# Patient Record
Sex: FEMALE | Race: WHITE | NOT HISPANIC OR LATINO | Employment: STUDENT | URBAN - METROPOLITAN AREA
[De-identification: names, ages, dates, MRNs, and addresses within clinical notes are randomized per-mention and may not be internally consistent; named-entity substitution may affect disease eponyms.]

---
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
## 2022-01-04 ENCOUNTER — OFFICE VISIT (OUTPATIENT)
Dept: OBGYN CLINIC | Facility: CLINIC | Age: 22
End: 2022-01-04
Payer: COMMERCIAL

## 2022-01-04 ENCOUNTER — APPOINTMENT (OUTPATIENT)
Dept: RADIOLOGY | Facility: CLINIC | Age: 22
End: 2022-01-04
Payer: COMMERCIAL

## 2022-01-04 VITALS
WEIGHT: 121 LBS | DIASTOLIC BLOOD PRESSURE: 75 MMHG | SYSTOLIC BLOOD PRESSURE: 113 MMHG | TEMPERATURE: 97.5 F | HEART RATE: 85 BPM | HEIGHT: 67 IN | BODY MASS INDEX: 18.99 KG/M2

## 2022-01-04 DIAGNOSIS — M25.561 ACUTE PAIN OF RIGHT KNEE: ICD-10-CM

## 2022-01-04 DIAGNOSIS — Q79.60 EHLERS-DANLOS SYNDROME: ICD-10-CM

## 2022-01-04 DIAGNOSIS — Z98.890 HISTORY OF RIGHT KNEE SURGERY: ICD-10-CM

## 2022-01-04 DIAGNOSIS — M25.561 ACUTE PAIN OF RIGHT KNEE: Primary | ICD-10-CM

## 2022-01-04 PROCEDURE — 99214 OFFICE O/P EST MOD 30 MIN: CPT | Performed by: ORTHOPAEDIC SURGERY

## 2022-01-04 PROCEDURE — 73562 X-RAY EXAM OF KNEE 3: CPT

## 2022-01-04 NOTE — PROGRESS NOTES
Assessment/Plan:  1  Acute pain of right knee  XR knee 3 vw right non injury    MRI knee right wo contrast   2  Logan-Danlos syndrome     3  History of right knee surgery         Too Cruz has right-sided knee pain and a injury concerning for a possible tear  I am concerned that she may have had a repeat patellar subluxation and possible MPFL injury as well as possibility of a lateral meniscus tear  There is also an abnormality over the medial patella which is unclear of prior postoperative changes or new chondral defect  Given her effusion, history and examination today I do think an MRI of the right knee is warranted  Advised to avoid athletic activity and can weightbear as tolerated for now  She can continue with ice and elevation for now  Follow-up after MRI is complete  Subjective:   Quoc Turner is a 25 y o  female who presents * to the office for evaluation for right-sided knee pain  She has a history of Logan-Danlos syndrome and history of recent MPFL reconstruction by Dr Sohan Oliver in 2020  She had a new injury 5 days ago while she was playing soccer with friends  She had a hyperextension injury to her right knee and had severe pain  She had difficulty walking and swelling throughout the knee  She states her knee was quite swollen last night  She has very difficult time bending her knee or fully extending her knee  She did not witness a patellar dislocation with this injury but does feel like "something weird" happened within knee  Today she has aching throbbing pain throughout the anterior and lateral aspect of her right knee  It is difficult for her to walk and her pain does improve with rest and ice  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 [employer] for the next month      Review of Systems   Constitutional: Negative for chills, fever and unexpected weight change  HENT: Negative for hearing loss, nosebleeds and sore throat  Eyes: Negative for pain, redness and visual disturbance  Respiratory: Negative for cough, shortness of breath and wheezing  Cardiovascular: Negative for chest pain, palpitations and leg swelling  Gastrointestinal: Negative for abdominal pain, nausea and vomiting  Endocrine: Negative for polydipsia and polyuria  Genitourinary: Negative for dysuria and hematuria  Musculoskeletal:        See HPI   Skin: Negative for rash and wound  Neurological: Negative for dizziness, numbness and headaches  Psychiatric/Behavioral: Negative for decreased concentration and suicidal ideas  The patient is not nervous/anxious  Past Medical History:   Diagnosis Date    ADHD     Asthma     Pernio     Raynaud disease        Past Surgical History:   Procedure Laterality Date    HAND SURGERY      ganglion cyst    AK KNEE SCOPE,DIAGNOSTIC Right 5/21/2020    Procedure: ARTHROSCOPY KNEE;  Surgeon: Lb Newberry MD;  Location: [address] [place] [address];  Service: Orthopedics    AK LIGMT REVISION,KNEE,EXTRA-ARTIC Right 5/21/2020    Procedure: KNEE DIAGNOSTIC ARTHROSCOPY WITH OPEN MEDIAL PATELLOFEMORAL LIGAMENT RECONSTRUCTION WITH HAMSTRING AUTOGRAFT (MPFL);   Surgeon: Lb Newberry MD;  Location: [facility];  Service: Orthopedics       Family History   Problem Relation Age of Onset    No Known Problems Mother     No Known Problems Father     No Known Problems Sister     No Known Problems Brother     No Known Problems Maternal Aunt     No Known Problems Maternal Uncle     No Known Problems Paternal Aunt     No Known Problems Paternal Uncle     No Known Problems Maternal Grandmother     No Known Problems Maternal Grandfather     No Known Problems Paternal Grandmother     No Known Problems Paternal Grandfather        Social History     Occupational History    Not on file   Tobacco Use    Smoking status: Never Smoker    Smokeless tobacco: Never Used   Vaping Use    Vaping Use: Never used   Substance and Sexual Activity    Alcohol use: No    Drug use: No    Sexual activity: Not on file         Current Outpatient Medications:     albuterol (PROVENTIL HFA,VENTOLIN HFA) 90 mcg/act inhaler, Inhale 2 puffs every 6 (six) hours as needed for wheezing, Disp: , Rfl:     budesonide-formoterol (SYMBICORT) 160-4 5 mcg/act inhaler, Inhale 2 puffs 2 (two) times a day Rinse mouth after use , Disp: , Rfl:     EPINEPHrine (EPIPEN 2-NORA) 0 3 mg/0 3 mL SOAJ, , Disp: , Rfl:     fluticasone (FLONASE) 50 mcg/act nasal spray, 1 spray into each nostril daily, Disp: , Rfl:     methylphenidate (CONCERTA) 18 mg ER tablet, Take 18 mg by mouth daily as needed , Disp: , Rfl:     olopatadine (PATANOL) 0 1 % ophthalmic solution, , Disp: , Rfl:     calcium carbonate-vitamin D (OSCAL-D) 500 mg-200 units per tablet, Take 1 tablet by mouth 2 (two) times a day with meals (Patient not taking: Reported on 1/4/2022 ), Disp: , Rfl:     midodrine (PROAMATINE) 2 5 mg tablet, TAKE 1/2 TABLET BY MOUTH EVERYDAY FOR 2 MONTHS, THEN 1 TABLET EVERYDAY  DO NOT LAY FLAT FOR 4 HOURS (Patient not taking: Reported on 1/4/2022), Disp: , Rfl:     Allergies   Allergen Reactions    Betadine [Povidone Iodine]      HIVES    Celebrex [Celecoxib] Hives    Nuts - Food Allergy     Other Hives     PEANUTS, TREENUTS, CELERY    Soya Lecithin [Lecithin]        Objective:  Vitals:    01/04/22 1115   BP: 113/75   Pulse: 85   Temp: 97 5 °F (36 4 °C)       Right Knee Exam     Tenderness   The patient is experiencing tenderness in the lateral joint line, patella and medial retinaculum  Range of Motion   Extension:  -5 abnormal   Flexion:  120 abnormal     Tests   Justyn:  Medial - negative Lateral - positive  Varus: negative Valgus: negative  Lachman:  Anterior - trace      Drawer:  Anterior - negative    Posterior - negative    Other   Erythema: absent  Sensation: normal  Pulse: present  Swelling: mild  Effusion: effusion present          Observations     Right Knee   Positive for effusion         Physical Exam  Vitals and nursing note reviewed  Constitutional:       Appearance: She is well-developed  HENT:      Head: Normocephalic and atraumatic  Eyes:      General: No scleral icterus  Conjunctiva/sclera: Conjunctivae normal    Cardiovascular:      Rate and Rhythm: Normal rate  Pulmonary:      Effort: Pulmonary effort is normal  No respiratory distress  Musculoskeletal:      Cervical back: Normal range of motion and neck supple  Right knee: Effusion present  Instability Tests: Lateral Justyn test positive  Medial Justyn test negative  Comments: As noted in HPI   Skin:     General: Skin is warm and dry  Neurological:      Mental Status: She is alert and oriented to person, place, and time  Psychiatric:         Behavior: Behavior normal          I have personally reviewed pertinent films in PACS and my interpretation is as follows: Three-view x-ray of the right knee demonstrates postoperative changes without evidence of clear fracture  Mild joint effusion    Abnormality over the medial patella concerning for chondral defect versus postoperative MPFL reconstruction

## 2022-01-21 ENCOUNTER — HOSPITAL ENCOUNTER (OUTPATIENT)
Dept: RADIOLOGY | Facility: HOSPITAL | Age: 22
Discharge: HOME | End: 2022-01-21
Attending: ORTHOPAEDIC SURGERY
Payer: COMMERCIAL

## 2022-01-21 DIAGNOSIS — M25.561 ACUTE PAIN OF RIGHT KNEE: ICD-10-CM

## 2022-01-21 PROCEDURE — G1004 CDSM NDSC: HCPCS

## 2022-01-21 PROCEDURE — 73721 MRI JNT OF LWR EXTRE W/O DYE: CPT

## 2022-01-26 ENCOUNTER — OFFICE VISIT (OUTPATIENT)
Dept: OBGYN CLINIC | Facility: CLINIC | Age: 22
End: 2022-01-26
Payer: COMMERCIAL

## 2022-01-26 VITALS
BODY MASS INDEX: 18.99 KG/M2 | TEMPERATURE: 97 F | WEIGHT: 121 LBS | DIASTOLIC BLOOD PRESSURE: 70 MMHG | SYSTOLIC BLOOD PRESSURE: 107 MMHG | HEIGHT: 67 IN | HEART RATE: 69 BPM

## 2022-01-26 DIAGNOSIS — S83.001A PATELLAR SUBLUXATION, RIGHT, INITIAL ENCOUNTER: Primary | ICD-10-CM

## 2022-01-26 PROCEDURE — 99213 OFFICE O/P EST LOW 20 MIN: CPT | Performed by: ORTHOPAEDIC SURGERY

## 2022-01-26 NOTE — PROGRESS NOTES
Assessment/Plan:  1  Patellar subluxation, right, initial encounter         Too Cruz has right-sided knee pain consistent with a patellar subluxation  Her MRI does not show any evidence of tear and her postoperative changes in the knee appears stable  She does not appear have a new injury or bruising along the knee suggestive of a patellar dislocation  I think she simply suffered a subluxation and will significantly improve over the next few weeks  I did give her a patellar stabilizing knee brace today which she can wear with future for sports and can wear this temporarily with physical activity  I gave her home exercises and we could consider formal physical therapy if she continues to have pain  Follow up as needed  Subjective:   Quoc Turner is a 25 y o  female who presents to the office for follow-up for right-sided knee pain  She was last in the office 3 weeks ago after right knee injury  She was playing soccer and hyperextend her right knee and felt severe pain over the anterior aspect the knee  She felt like her kneecap buckled on her  She does have a history of Logan-Danlos syndrome  She also has a history of MPFL reconstruction by Dr Sohan Oliver 2 years ago  At last office visit she was sent for an MRI and she returns for results today  She states that her knee does feel better and she still has some mild discomfort laterally  Her swelling has gone down throughout the knee and she denies mechanical symptoms of locking or catching  Review of Systems   Constitutional: Negative for chills, fever and unexpected weight change  HENT: Negative for hearing loss, nosebleeds and sore throat  Eyes: Negative for pain, redness and visual disturbance  Respiratory: Negative for cough, shortness of breath and wheezing  Cardiovascular: Negative for chest pain, palpitations and leg swelling  Gastrointestinal: Negative for abdominal pain, nausea and vomiting     Endocrine: Negative for polydipsia and polyuria  Genitourinary: Negative for dysuria and hematuria  Musculoskeletal:        See HPI   Skin: Negative for rash and wound  Neurological: Negative for dizziness, numbness and headaches  Psychiatric/Behavioral: Negative for decreased concentration and suicidal ideas  The patient is not nervous/anxious  Past Medical History:   Diagnosis Date    ADHD     Asthma     Pernio     Raynaud disease        Past Surgical History:   Procedure Laterality Date    HAND SURGERY      ganglion cyst    ME KNEE SCOPE,DIAGNOSTIC Right 5/21/2020    Procedure: ARTHROSCOPY KNEE;  Surgeon: Cezar Parada MD;  Location: [address] [place] [address];  Service: Orthopedics    ME LIGMT REVISION,KNEE,EXTRA-ARTIC Right 5/21/2020    Procedure: KNEE DIAGNOSTIC ARTHROSCOPY WITH OPEN MEDIAL PATELLOFEMORAL LIGAMENT RECONSTRUCTION WITH HAMSTRING AUTOGRAFT (MPFL);   Surgeon: Cezar Parada MD;  Location: [facility];  Service: Orthopedics       Family History   Problem Relation Age of Onset    No Known Problems Mother     No Known Problems Father     No Known Problems Sister     No Known Problems Brother     No Known Problems Maternal Aunt     No Known Problems Maternal Uncle     No Known Problems Paternal Aunt     No Known Problems Paternal Uncle     No Known Problems Maternal Grandmother     No Known Problems Maternal Grandfather     No Known Problems Paternal Grandmother     No Known Problems Paternal Grandfather        Social History     Occupational History    Not on file   Tobacco Use    Smoking status: Never Smoker    Smokeless tobacco: Never Used   Vaping Use    Vaping Use: Never used   Substance and Sexual Activity    Alcohol use: No    Drug use: No    Sexual activity: Not on file         Current Outpatient Medications:     albuterol (PROVENTIL HFA,VENTOLIN HFA) 90 mcg/act inhaler, Inhale 2 puffs every 6 (six) hours as needed for wheezing, Disp: , Rfl:     budesonide-formoterol (SYMBICORT) 160-4 5 mcg/act inhaler, Inhale 2 puffs 2 (two) times a day Rinse mouth after use , Disp: , Rfl:     EPINEPHrine (EPIPEN 2-NORA) 0 3 mg/0 3 mL SOAJ, , Disp: , Rfl:     fluticasone (FLONASE) 50 mcg/act nasal spray, 1 spray into each nostril daily, Disp: , Rfl:     methylphenidate (CONCERTA) 18 mg ER tablet, Take 18 mg by mouth daily as needed , Disp: , Rfl:     olopatadine (PATANOL) 0 1 % ophthalmic solution, , Disp: , Rfl:     calcium carbonate-vitamin D (OSCAL-D) 500 mg-200 units per tablet, Take 1 tablet by mouth 2 (two) times a day with meals (Patient not taking: Reported on 1/4/2022 ), Disp: , Rfl:     midodrine (PROAMATINE) 2 5 mg tablet, TAKE 1/2 TABLET BY MOUTH EVERYDAY FOR 2 MONTHS, THEN 1 TABLET EVERYDAY  DO NOT LAY FLAT FOR 4 HOURS (Patient not taking: Reported on 1/4/2022), Disp: , Rfl:     Allergies   Allergen Reactions    Betadine [Povidone Iodine]      HIVES    Celebrex [Celecoxib] Hives    Nuts - Food Allergy     Other Hives     PEANUTS, TREENUTS, CELERY    Soya Lecithin [Lecithin]        Objective:  Vitals:    01/26/22 0933   BP: 107/70   Pulse: 69   Temp: (!) 97 °F (36 1 °C)       Right Knee Exam     Tenderness   The patient is experiencing tenderness in the patella and lateral retinaculum  Range of Motion   Extension: normal   Flexion: normal     Tests   Justyn:  Medial - negative Lateral - negative    Other   Erythema: absent  Sensation: normal  Pulse: present  Swelling: none  Effusion: no effusion present          Observations     Right Knee   Negative for effusion  Physical Exam  Vitals and nursing note reviewed  Constitutional:       Appearance: She is well-developed  HENT:      Head: Normocephalic and atraumatic  Eyes:      General: No scleral icterus  Conjunctiva/sclera: Conjunctivae normal    Cardiovascular:      Rate and Rhythm: Normal rate  Pulmonary:      Effort: Pulmonary effort is normal  No respiratory distress     Musculoskeletal:      Cervical back: Normal range of motion and neck supple  Right knee: No effusion  Instability Tests: Medial Justyn test negative and lateral Justyn test negative  Comments: As noted in HPI   Skin:     General: Skin is warm and dry  Neurological:      Mental Status: She is alert and oriented to person, place, and time  Psychiatric:         Behavior: Behavior normal          I have personally reviewed pertinent films in PACS and my interpretation is as follows:  MRI of the right knee demonstrates no evidence of acute tear  Postoperative changes status post MPFL reconstruction appears stable    No evidence of recent patellar dislocation

## 2022-07-05 ENCOUNTER — TELEPHONE (OUTPATIENT)
Dept: CARDIOLOGY CLINIC | Facility: CLINIC | Age: 22
End: 2022-07-05

## 2022-07-05 ENCOUNTER — OFFICE VISIT (OUTPATIENT)
Dept: CARDIOLOGY CLINIC | Facility: CLINIC | Age: 22
End: 2022-07-05
Payer: COMMERCIAL

## 2022-07-05 VITALS
HEIGHT: 67 IN | WEIGHT: 118 LBS | HEART RATE: 59 BPM | OXYGEN SATURATION: 99 % | TEMPERATURE: 97.7 F | DIASTOLIC BLOOD PRESSURE: 64 MMHG | SYSTOLIC BLOOD PRESSURE: 90 MMHG | BODY MASS INDEX: 18.52 KG/M2

## 2022-07-05 DIAGNOSIS — R51.9 CHRONIC NONINTRACTABLE HEADACHE, UNSPECIFIED HEADACHE TYPE: ICD-10-CM

## 2022-07-05 DIAGNOSIS — G90.A POTS (POSTURAL ORTHOSTATIC TACHYCARDIA SYNDROME): ICD-10-CM

## 2022-07-05 DIAGNOSIS — I73.00 RAYNAUD'S PHENOMENON WITHOUT GANGRENE: ICD-10-CM

## 2022-07-05 DIAGNOSIS — Q79.60 EHLERS-DANLOS SYNDROME: ICD-10-CM

## 2022-07-05 DIAGNOSIS — R55 POSTURAL DIZZINESS WITH NEAR SYNCOPE: ICD-10-CM

## 2022-07-05 DIAGNOSIS — M35.9 AUTOIMMUNE CONNECTIVE TISSUE DISORDER (HCC): ICD-10-CM

## 2022-07-05 DIAGNOSIS — R42 POSTURAL DIZZINESS WITH NEAR SYNCOPE: ICD-10-CM

## 2022-07-05 DIAGNOSIS — R00.2 INTERMITTENT PALPITATIONS: ICD-10-CM

## 2022-07-05 DIAGNOSIS — G89.29 CHRONIC NONINTRACTABLE HEADACHE, UNSPECIFIED HEADACHE TYPE: ICD-10-CM

## 2022-07-05 DIAGNOSIS — Z86.79 HISTORY OF PSVT (PAROXYSMAL SUPRAVENTRICULAR TACHYCARDIA): Primary | ICD-10-CM

## 2022-07-05 PROBLEM — I49.8 POTS (POSTURAL ORTHOSTATIC TACHYCARDIA SYNDROME): Status: ACTIVE | Noted: 2022-07-05

## 2022-07-05 PROCEDURE — 93000 ELECTROCARDIOGRAM COMPLETE: CPT | Performed by: INTERNAL MEDICINE

## 2022-07-05 PROCEDURE — 99215 OFFICE O/P EST HI 40 MIN: CPT | Performed by: INTERNAL MEDICINE

## 2022-07-05 NOTE — PATIENT INSTRUCTIONS
We have asked the patient to sign a record release authorization and will fax that to the office of Dr Noel Villarreal, office number [phone]  We will specifically request results of prior tilt-table test, alter monitor, echocardiogram, and last pertinent office note  We have ordered a head upright tilt-table test update, a three day Zio XT patch monitor and will consider a future exercise treadmill test to document cardiac rhythm and blood pressure responses to exercise  We will contact the patient with the results of the above studies as they become available  Continue current medication for now

## 2022-07-05 NOTE — PROGRESS NOTES
HISTORY & PHYSICAL  Jayme Altamirano 23 y o  female MRN: [id number]  Unit/Bed#:  Encounter: [id number]  Assessment:    1  Persistent marked tachycardia with exercise, along with postural near syncope, both present for approximately six months, possibly related to known underlying POTS  2  Clinical diagnosis of Logan-Danlos syndrome, for at least two years  3  Unspecified autoimmune disease, based on abnormal RENITA titer of 1:320 with speckled pattern  4  History of Raynaud's phenomenon  5  Chronic generalized headache, primarily with upright position for prolonged periods, present for 6-7 years  6  History of chronic fatigue  7  History of ADHD  Plan:      Patient Instructions     1  We have asked the patient to sign a record release authorization and will fax that to the office of Dr Hortensia Canchola, office number [phone]  We will specifically request results of prior tilt-table test, Holter monitor, echocardiogram, and last pertinent office note  2  Since patient has progressive posturaldizziness and tachycardia over the past six months with suspicion of POTS,  we have ordered a head upright tilt-table test update, a three day Zio XT patch monitor and will consider a future exercise treadmill test to document cardiac rhythm and blood pressure responses to exercise  3   We will contact the patient with the results of the above studies as they become available  4  Continue current medication for now  Current Outpatient Medications   Medication Sig Dispense Refill    albuterol (PROVENTIL HFA,VENTOLIN HFA) 90 mcg/act inhaler Inhale 2 puffs every 6 (six) hours as needed for wheezing      budesonide-formoterol (SYMBICORT) 160-4 5 mcg/act inhaler Inhale 2 puffs 2 (two) times a day Rinse mouth after use        EPINEPHrine (EPIPEN) 0 3 mg/0 3 mL SOAJ       methylphenidate (CONCERTA) 18 mg ER tablet Take 18 mg by mouth daily as needed       olopatadine (PATANOL) 0 1 % ophthalmic solution No current facility-administered medications for this visit  Counseling :     A description of the counseling / coordination of care:  Explained nature of differential diagnosis and potential test types to better define current clinical status    Goals and Barriers:  Main goals are to assist with resolution of postural near-syncope, dizziness, exercise induced rapid heart action  There are no barriers to care  Patient's ability to self care:  Satisfactory        History of Present Illness     Chief Complaint:    HPI: Kita Rabago is a 23y o  year old female who presents with recent complaints of postural near syncope for about six months, frequent episodes of rapid heart rate with associated chest heaviness, a weird feeling in her body, generally noticed when running more than 1 5-2 miles, also for about six months  She denies any associated dyspnea, chest pain, and denies any history of orthopnea, paroxysmal nocturnal dyspnea, edema, cough, sputum production, but does have a history of occasional wheezing during upper respiratory tract infections or sinus infections, for which she uses as needed inhalers  The patient has had occasional brief episodes of syncope, lasting for about 20 seconds, with preceding presyncope, so that she normally sits down prior to the actual episodes of syncope  There is no history of seizure disorder, urinary or fecal incontinence, tongue or intraoral laceration  The patient also complains of chronic daily headaches that are generalized and especially occur with prolonged standing, which she alleges have been present for 6-7 years and are relieved by use of Tylenol, Advil, and sleep  She has not seen any specialist regarding her headache history  Her headaches in the past have been blamed on recurrent concussions, which she has had from various sports such as soccer, and accidental head trauma while in marching band and doing canoeing      This patient was diagnosed as having Logan-Danlos syndrome with POTS approximately two years ago  She also has unspecified connective tissue disorder, based on a nonspecific increase in RENITA titer to a level of 1:320 with a speckled pattern as of 06/19/2020  The patient does have a history of joint hypermobility, including dislocation of her right patella, previously requiring surgery  She also has a history of scattered bruises and excessive scarring  It is thought that she might have autonomic dysfunction complicating this condition  She has not had molecular genetic testing  More recently, the patient has noticed heart rates as high as 200 bpm one running more than 1 5-2 miles/hour, documented on her Fitbit base  She also complains of feeling tired all the time and for the past 1 5-2 years  The patient originally was diagnosed with Raynaud's phenomena since the age of 10  She was under previous care of rheumatologist but has had no follow-up for more than two years  The patient carries a chronic diagnosis of ADHD, for which she uses Concerta during the academic year  The patient did have two episodes of COVID 19 infection in January and December of 2021, which were uncomplicated  She received appropriate COVID vaccination and boosting in 2021  The patient lives with her mother in [place], [place] is a full-time student at [employer], where she will be starting her louise year  She has a combined exercise science and computer science major and hopes to go on to further Education as a physician's assistant  She has an older sister and brother  She works as a summer day camp counselor currently and is also a  for [employer]  She denies ever using tobacco, rarely uses alcohol and denies illicit drug use    The patient has always been a runner and belongs to a track and running club at [employer] where the students typically participate in [employer] and 10K events  Historical Information   Past Medical History:   Diagnosis Date    ADHD     Asthma     Pernio     Raynaud disease      Past Surgical History:   Procedure Laterality Date    HAND SURGERY      ganglion cyst    IN KNEE SCOPE,DIAGNOSTIC Right 5/21/2020    Procedure: ARTHROSCOPY KNEE;  Surgeon: Sandra Ocampo MD;  Location: [address] [place] [address];  Service: Orthopedics    IN LIGMT REVISION,KNEE,EXTRA-ARTIC Right 5/21/2020    Procedure: KNEE DIAGNOSTIC ARTHROSCOPY WITH OPEN MEDIAL PATELLOFEMORAL LIGAMENT RECONSTRUCTION WITH HAMSTRING AUTOGRAFT (MPFL); Surgeon: Sandra Ocampo MD;  [facility];  Service: Orthopedics     Social History     Substance and Sexual Activity   Alcohol Use No     Social History     Substance and Sexual Activity   Drug Use No     Social History     Tobacco Use   Smoking Status Never Smoker   Smokeless Tobacco Never Used     Family History   Problem Relation Age of Onset    No Known Problems Mother     No Known Problems Father     No Known Problems Sister     No Known Problems Brother     No Known Problems Maternal Aunt     No Known Problems Maternal Uncle     No Known Problems Paternal Aunt     No Known Problems Paternal Uncle     No Known Problems Maternal Grandmother     No Known Problems Maternal Grandfather     No Known Problems Paternal Grandmother     No Known Problems Paternal Grandfather        Meds/Allergies   Prior to Admission medications    Medication Sig Start Date End Date Taking? Authorizing Provider   albuterol (PROVENTIL HFA,VENTOLIN HFA) 90 mcg/act inhaler Inhale 2 puffs every 6 (six) hours as needed for wheezing   Yes Historical Provider, MD   budesonide-formoterol (SYMBICORT) 160-4 5 mcg/act inhaler Inhale 2 puffs 2 (two) times a day Rinse mouth after use     Yes Historical Provider, MD   EPINEPHrine (EPIPEN) 0 3 mg/0 3 mL SOAJ  11/12/15  Yes Historical Provider, MD   methylphenidate (CONCERTA) 18 mg ER tablet Take 18 mg by mouth daily as needed  2/18/20  Yes Historical Provider, MD   olopatadine (PATANOL) 0 1 % ophthalmic solution  8/1/19  Yes Historical Provider, MD   fluticasone (FLONASE) 50 mcg/act nasal spray 1 spray into each nostril daily  7/5/22 Yes Historical Provider, MD   calcium carbonate-vitamin D (OSCAL-D) 500 mg-200 units per tablet Take 1 tablet by mouth 2 (two) times a day with meals  Patient not taking: No sig reported  7/5/22  Historical Provider, MD   midodrine (PROAMATINE) 2 5 mg tablet TAKE 1/2 TABLET BY MOUTH EVERYDAY FOR 2 MONTHS, THEN 1 TABLET EVERYDAY  DO NOT LAY FLAT FOR 4 HOURS  Patient not taking: No sig reported 9/10/20 7/5/22  Historical Provider, MD     Allergies   Allergen Reactions    Betadine [Povidone Iodine]      HIVES    Celebrex [Celecoxib] Hives    Nuts - Food Allergy     Other Hives     PEANUTS, TREENUTS, CELERY    Soya Lecithin [Lecithin]        Cardiovascular ROS:     Systems negative, except as noted in history of present illness  Objective   Vitals:   Vitals:    07/05/22 0807   BP: 90/64   BP Location: Left arm   Patient Position: Sitting   Cuff Size: Standard   Pulse: 59   Temp: 97 7 °F (36 5 °C)   SpO2: 99%   Weight: 53 5 kg (118 lb)   Height: 5' 7" (1 702 m)       Body mass index is 18 48 kg/m²  Physical Exam    General-Well-developed well-nourished slender  female  in no acute distress  Patient is alert, oriented X3 and cooperative  Skin-Warm and dry with no pallor, rashes, ecchymoses, lesions  HEENT-Normocephalic  Pupils equally round and reactive to light and accommodation  Extraocular muscles intact  Mucous membranes pink and moist  Sclerae nonicteric  Optic fundi poorly seen  Neck-No jugular venous distention, AJR, masses, thyromegaly, adenopathy, bruits  Lungs-No rales, rhonchi, wheezes  Heart-No murmur, gallop, rub, click, heave, thrill  Abdomen-Normal bowel sounds with no masses, organomegaly, guarding, tenderness, or rigidity    Extremities-No cyanosis, clubbing, edema, pulse decrease  Neurological-No focal neurological signs  Imaging:      EKG 07/05/2022:      Sinus bradycardia at 59 bpm   Otherwise normal ECG  Slower heart rate by 13 bpm with no other changes since 07/24/2020      Chest x-ray:    No Chest XR results available for this patient  Cardiac testing:     No results available      Labs:     Lab Results   Component Value Date    WBC 6 77 07/24/2020    HGB 12 9 07/24/2020    HCT 36 8 07/24/2020    MCV 87 07/24/2020     07/24/2020     Lab Results   Component Value Date    SODIUM 136 07/24/2020    K 3 1 (L) 07/24/2020     07/24/2020    CO2 28 07/24/2020    BUN 18 07/24/2020    CREATININE 0 93 07/24/2020    CALCIUM 9 0 07/24/2020    AST 20 07/24/2020    ALT 21 07/24/2020    ALKPHOS 81 07/24/2020    EGFR 90 07/24/2020   Glucose 07/24/2020:  120    Lab Results   Component Value Date    CALCIUM 9 0 07/24/2020    K 3 1 (L) 07/24/2020    CO2 28 07/24/2020     07/24/2020    BUN 18 07/24/2020    CREATININE 0 93 07/24/2020     RENITA titer 06/19/2020:  1:320 with speckled pattern    SARS-CoV-2 PCR 03/30/2021:  Positive  CBC 07/24/2020: Total visit time spent today 68 minutes

## 2022-07-18 ENCOUNTER — DOCUMENTATION (OUTPATIENT)
Dept: CARDIOLOGY CLINIC | Facility: CLINIC | Age: 22
End: 2022-07-18

## 2022-07-19 ENCOUNTER — HOSPITAL ENCOUNTER (OUTPATIENT)
Dept: NON INVASIVE DIAGNOSTICS | Facility: HOSPITAL | Age: 22
Discharge: HOME | End: 2022-07-19
Attending: INTERNAL MEDICINE | Admitting: INTERNAL MEDICINE
Payer: COMMERCIAL

## 2022-07-19 DIAGNOSIS — G90.A POTS (POSTURAL ORTHOSTATIC TACHYCARDIA SYNDROME): ICD-10-CM

## 2022-07-19 PROCEDURE — 93660 TILT TABLE EVALUATION: CPT

## 2022-07-19 NOTE — NURSING NOTE
Pt completed tilt exam  Pt experienced syncope at 30min shawene  HR in 50s  BP undetectable  Pt laid back in supine position immediately after syncope  BP 88/49 HR 60 After 5min in supine position, pt vitals returned to baseline  /64 HR 61  Pt sat at edge of table before being discharged home  Report given to Dr Trini Arroyo

## 2022-07-20 PROCEDURE — 93660 TILT TABLE EVALUATION: CPT | Performed by: INTERNAL MEDICINE

## 2022-07-21 ENCOUNTER — TELEPHONE (OUTPATIENT)
Dept: CARDIOLOGY CLINIC | Facility: CLINIC | Age: 22
End: 2022-07-21

## 2022-07-21 NOTE — RESULT ENCOUNTER NOTE
Message sent to patient on 07/21/2022:      Luis Antonio Dill tilt-table test was abnormal, but the findings are not an emergency  Could you call our office and schedule a follow-up appointment to discuss various therapy options with me? MD Claudia Ponce,    Your tilt-table test was abnormal, but the findings are not an emergency  Could you call our office and schedule a follow-up appointment to discuss various therapy options with me? Phone number is [phone]      Israel Carrion MD

## 2022-07-21 NOTE — TELEPHONE ENCOUNTER
----- Message from Joel Yepez MD sent at 7/21/2022  2:09 PM EDT -----  Message sent to patient on 07/21/2022:      Edwin Scott tilt-table test was abnormal, but the findings are not an emergency  Could you call our office and schedule a follow-up appointment to discuss various therapy options with me? MD Nancy Jadeeusebio [place],    Your tilt-table test was abnormal, but the findings are not an emergency  Could you call our office and schedule a follow-up appointment to discuss various therapy options with me? Phone number is [phone]      Joel Yepez MD

## 2022-07-28 ENCOUNTER — DOCUMENTATION (OUTPATIENT)
Dept: CARDIOLOGY CLINIC | Facility: CLINIC | Age: 22
End: 2022-07-28

## 2022-07-28 ENCOUNTER — CLINICAL SUPPORT (OUTPATIENT)
Dept: CARDIOLOGY CLINIC | Facility: CLINIC | Age: 22
End: 2022-07-28
Payer: COMMERCIAL

## 2022-07-28 DIAGNOSIS — Z86.79 HISTORY OF PSVT (PAROXYSMAL SUPRAVENTRICULAR TACHYCARDIA): ICD-10-CM

## 2022-07-28 DIAGNOSIS — R00.2 INTERMITTENT PALPITATIONS: ICD-10-CM

## 2022-07-28 PROCEDURE — 93244 EXT ECG>48HR<7D REV&INTERPJ: CPT | Performed by: INTERNAL MEDICINE

## 2022-07-28 NOTE — RESULT ENCOUNTER NOTE
Zio XT ambulatory extended monitor 7/7 through 07/10/2022:    Preliminary Findings:    Patient had a min HR of 44 bpm, max HR of 157 bpm, and avg HR of 76 bpm   Predominant underlying rhythm was Sinus Rhythm  Isolated SVEs  were rare (<1 0%), and no SVE Couplets or SVE Triplets were present  No atrial fibrillation, flutter, or SVT was noted  Isolated VEs were rare (<1 0%), and no VE Couplets or VE Triplets were  present  No ventricular tachycardia seen  Conclusions:    1  There were symptoms of dizziness on 07/08/2022 correlating with sinus tachycardia at 142 bpm during handball exercise  There was also dizziness with fluttering or racing of the heart corresponding with playing with sinus tachycardia at heart rate of 118 bpm   2  Symptoms of dizziness, fluttering or racing, and lightheadedness while standing up occurred on 07/08/2022 and corresponded to normal sinus rhythm at 78 bpm   A subsequent episode of dizziness on 07/08/2022 corresponded to normal sinus rhythm at 97 bpm and later while walking with normal sinus rhythm at 82 bpm    3  There were 33 patient triggered events, all of which showed normal sinus rhythm or sinus tachycardia with heart rates ranging from 65 bpm on 07/08/2022 to a maximum of 110 bpm on 07/09/2022 and to 157 bpm at 9:22 a m  on 07/10/2022   4  There were rare isolated PACs and PVCs with no corresponding symptoms  5  No evidence of atrial fibrillation, atrial flutter, ventricular tachycardia, PSVT

## 2022-07-29 ENCOUNTER — TELEPHONE (OUTPATIENT)
Dept: CARDIOLOGY CLINIC | Facility: CLINIC | Age: 22
End: 2022-07-29

## 2022-07-29 NOTE — PROGRESS NOTES
Zio XT ambulatory extended monitor 7/7 through 07/10/2022:    Preliminary Findings:    Patient had a min HR of 44 bpm, max HR of 157 bpm, and avg HR of 76 bpm   Predominant underlying rhythm was Sinus Rhythm  Isolated SVEs  were rare (<1 0%), and no SVE Couplets or SVE Triplets were present  No atrial fibrillation, flutter, or SVT was noted  Isolated VEs were rare (<1 0%), and no VE Couplets or VE Triplets were  present  No ventricular tachycardia seen  Conclusions:    1  There were symptoms of dizziness on 07/08/2022 correlating with sinus tachycardia at 142 bpm during handball exercise  There was also dizziness with fluttering or racing of the heart corresponding with music playing with sinus tachycardia at heart rate of 118 bpm   2  Symptoms of dizziness, fluttering or racing, and lightheadedness while standing up occurred on 07/08/2022 and corresponded to normal sinus rhythm at 78 bpm   Subsequent episodes of dizziness on 07/08/2022 corresponded to normal sinus rhythm at 97 bpm and later while walking with normal sinus rhythm at 82 bpm    3  There were 33 patient triggered events, all of which showed normal sinus rhythm or sinus tachycardia with heart rates ranging from 65 bpm on 07/08/2022 to a maximum of 110 bpm on 07/09/2022 and to 157 bpm at 9:22 a m  on 07/10/2022  Minimum heart rate of 44 bpm occurred early morning 07/09/2022 at 7:59 a m   4  There were rare isolated PACs and PVCs with no corresponding symptoms  5  No evidence of atrial fibrillation, atrial flutter, ventricular tachycardia, PSVT

## 2022-07-29 NOTE — TELEPHONE ENCOUNTER
----- Message from Josie Fishman MD sent at 7/28/2022  7:58 PM EDT -----    Jacqueline Bautista ambulatory extended monitor 7/7 through 07/10/2022:    Preliminary Findings:    Patient had a min HR of 44 bpm, max HR of 157 bpm, and avg HR of 76 bpm   Predominant underlying rhythm was Sinus Rhythm  Isolated SVEs  were rare (<1 0%), and no SVE Couplets or SVE Triplets were present  No atrial fibrillation, flutter, or SVT was noted  Isolated VEs were rare (<1 0%), and no VE Couplets or VE Triplets were  present  No ventricular tachycardia seen  Conclusions:    1  There were symptoms of dizziness on 07/08/2022 correlating with sinus tachycardia at 142 bpm during handball exercise  There was also dizziness with fluttering or racing of the heart corresponding with playing with sinus tachycardia at heart rate of 118 bpm   2  Symptoms of dizziness, fluttering or racing, and lightheadedness while standing up occurred on 07/08/2022 and corresponded to normal sinus rhythm at 78 bpm   A subsequent episode of dizziness on 07/08/2022 corresponded to normal sinus rhythm at 97 bpm and later while walking with normal sinus rhythm at 82 bpm    3  There were 33 patient triggered events, all of which showed normal sinus rhythm or sinus tachycardia with heart rates ranging from 65 bpm on 07/08/2022 to a maximum of 110 bpm on 07/09/2022 and to 157 bpm at 9:22 a m  on 07/10/2022   4  There were rare isolated PACs and PVCs with no corresponding symptoms  5  No evidence of atrial fibrillation, atrial flutter, ventricular tachycardia, PSVT

## 2022-07-29 NOTE — TELEPHONE ENCOUNTER
Marty Dave MD sent to [address] [place] [address]   Notify patient that I reviewed the three day Zio XT patch monitor results and found nothing serious       I would like her to schedule a non urgent appointment to review all the test results and possible options for treatment   Please assist her with that        Dr Robin Hernandez

## 2022-10-12 ENCOUNTER — APPOINTMENT (OUTPATIENT)
Dept: RADIOLOGY | Facility: CLINIC | Age: 22
End: 2022-10-12
Payer: COMMERCIAL

## 2022-10-12 ENCOUNTER — OFFICE VISIT (OUTPATIENT)
Dept: OBGYN CLINIC | Facility: CLINIC | Age: 22
End: 2022-10-12
Payer: COMMERCIAL

## 2022-10-12 VITALS
SYSTOLIC BLOOD PRESSURE: 102 MMHG | HEART RATE: 67 BPM | BODY MASS INDEX: 18.05 KG/M2 | WEIGHT: 115 LBS | DIASTOLIC BLOOD PRESSURE: 68 MMHG | HEIGHT: 67 IN

## 2022-10-12 DIAGNOSIS — M25.561 RIGHT KNEE PAIN, UNSPECIFIED CHRONICITY: Primary | ICD-10-CM

## 2022-10-12 DIAGNOSIS — M25.561 RIGHT KNEE PAIN, UNSPECIFIED CHRONICITY: ICD-10-CM

## 2022-10-12 DIAGNOSIS — S83.91XA SPRAIN OF RIGHT KNEE, UNSPECIFIED LIGAMENT, INITIAL ENCOUNTER: ICD-10-CM

## 2022-10-12 PROCEDURE — 99214 OFFICE O/P EST MOD 30 MIN: CPT | Performed by: ORTHOPAEDIC SURGERY

## 2022-10-12 PROCEDURE — 73562 X-RAY EXAM OF KNEE 3: CPT

## 2022-10-12 RX ORDER — CHLORHEXIDINE GLUCONATE 0.12 MG/ML
RINSE ORAL
COMMUNITY
Start: 2022-09-29

## 2022-10-12 RX ORDER — AMOXICILLIN 500 MG/1
TABLET, FILM COATED ORAL
COMMUNITY
Start: 2022-09-29

## 2022-10-12 NOTE — PROGRESS NOTES
Assessment/Plan:  1  Right knee pain, unspecified chronicity  XR knee 3 vw right non injury   2  Sprain of right knee, unspecified ligament, initial encounter       Scribe Attestation    I,:  Gigi Stephanie Boudreaux am acting as a scribe while in the presence of the attending physician :       I,:  Juan José Banks MD personally performed the services described in this documentation    as scribed in my presence :             Frank Kiana and I reviewed her x-rays together  They are normal in appearance  The patella is well centered and located appropriately  Her MPFL graft is readily palpable  It is possible that she suffered a mild sprain causing the onset of the swelling  I do recommend she return to exercise particularly focusing on eccentric quadricep strengthening as well as lateral hip strength  A physician directed home exercise program was provided to her  I explained to Frank Bruno that that the knee is very stable to my examination and there is no concern over recurrent injury or the need for additional surgery  She verbally states she understood and can follow-up with me as needed  Subjective:   Stacy Leiva is a 23 y o  female who presents to the office today for evaluation of her right knee  She has a history of an MPFL reconstruction performed 2 years and 4 months ago  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  She developed swelling following the tore that lasted from last Wednesday into Monday  She does not recall specific injury  She is having pain about the medial patellar facet  is described as an intermittent ache and described as moderate  Frank Kiana was seen by Dr Singh Keyes back in January of 2022 for recurrent knee pain  He did order an MRI which returned normal other than postoperative changes  He did provide her a brace that she admits to not using  Review of Systems   Constitutional: Negative for chills, fever and unexpected weight change     HENT: Negative for hearing loss, nosebleeds and sore throat  Eyes: Negative for pain, redness and visual disturbance  Respiratory: Negative for cough, shortness of breath and wheezing  Cardiovascular: Negative for chest pain, palpitations and leg swelling  Gastrointestinal: Negative for abdominal pain, nausea and vomiting  Endocrine: Negative for polydipsia and polyuria  Genitourinary: Negative for dysuria and hematuria  Musculoskeletal:        See HPI   Skin: Negative for rash and wound  Neurological: Negative for dizziness, numbness and headaches  Psychiatric/Behavioral: Negative for decreased concentration and suicidal ideas  The patient is not nervous/anxious  Past Medical History:   Diagnosis Date   • ADHD    • Asthma    • Pernio    • Raynaud disease        Past Surgical History:   Procedure Laterality Date   • HAND SURGERY      ganglion cyst   • DC KNEE SCOPE,DIAGNOSTIC Right 5/21/2020    Procedure: ARTHROSCOPY KNEE;  Surgeon: Sandra Ocampo MD;  Location: [address] [place] [address];  Service: Orthopedics   • DC LIGMT REVISION,KNEE,EXTRA-ARTIC Right 5/21/2020    Procedure: KNEE DIAGNOSTIC ARTHROSCOPY WITH OPEN MEDIAL PATELLOFEMORAL LIGAMENT RECONSTRUCTION WITH HAMSTRING AUTOGRAFT (MPFL);   Surgeon: Sandra Ocampo MD;  Location: [facility];  Service: Orthopedics       Family History   Problem Relation Age of Onset   • No Known Problems Mother    • No Known Problems Father    • No Known Problems Sister    • No Known Problems Brother    • No Known Problems Maternal Aunt    • No Known Problems Maternal Uncle    • No Known Problems Paternal Aunt    • No Known Problems Paternal Uncle    • No Known Problems Maternal Grandmother    • No Known Problems Maternal Grandfather    • No Known Problems Paternal Grandmother    • No Known Problems Paternal Grandfather        Social History     Occupational History   • Not on file   Tobacco Use   • Smoking status: Never Smoker   • Smokeless tobacco: Never Used   Vaping Use   • Vaping Use: Never used Substance and Sexual Activity   • Alcohol use: No   • Drug use: No   • Sexual activity: Not on file         Current Outpatient Medications:   •  albuterol (PROVENTIL HFA,VENTOLIN HFA) 90 mcg/act inhaler, Inhale 2 puffs every 6 (six) hours as needed for wheezing, Disp: , Rfl:   •  amoxicillin (AMOXIL) 500 MG tablet, , Disp: , Rfl:   •  budesonide-formoterol (SYMBICORT) 160-4 5 mcg/act inhaler, Inhale 2 puffs 2 (two) times a day Rinse mouth after use , Disp: , Rfl:   •  chlorhexidine (PERIDEX) 0 12 % solution, , Disp: , Rfl:   •  EPINEPHrine (EPIPEN) 0 3 mg/0 3 mL SOAJ, , Disp: , Rfl:   •  methylphenidate (CONCERTA) 18 mg ER tablet, Take 18 mg by mouth daily as needed , Disp: , Rfl:   •  olopatadine (PATANOL) 0 1 % ophthalmic solution, , Disp: , Rfl:     Allergies   Allergen Reactions   • Betadine [Povidone Iodine]      HIVES   • Celebrex [Celecoxib] Hives   • Nuts - Food Allergy    • Other Hives     PEANUTS, TREENUTS, CELERY   • Soya Lecithin [Lecithin]        Objective:  Vitals:    10/12/22 0815   BP: 102/68   Pulse: 67       Right Knee Exam     Tenderness   The patient is experiencing no tenderness  Range of Motion   Extension: 0   Flexion: 120     Tests   Justyn:  Medial - negative Lateral - negative  Varus: negative Valgus: negative  Lachman:  Anterior - negative    Posterior - negative  Drawer:  Anterior - negative    Posterior - negative  Patellar apprehension: positive    Other   Erythema: absent  Scars: present  Sensation: normal  Swelling: mild  Effusion: no effusion present    Comments:  Palpable MPFL graft          Observations     Right Knee   Negative for effusion  Physical Exam  Vitals reviewed  Constitutional:       Appearance: She is well-developed  HENT:      Head: Normocephalic and atraumatic  Eyes:      General:         Right eye: No discharge  Left eye: No discharge  Conjunctiva/sclera: Conjunctivae normal    Cardiovascular:      Rate and Rhythm: Regular rhythm  Pulmonary:      Effort: Pulmonary effort is normal  No respiratory distress  Breath sounds: No stridor  Musculoskeletal:      Cervical back: Normal range of motion and neck supple  Right knee: No effusion  Instability Tests: Medial Justyn test negative and lateral Justyn test negative  Skin:     General: Skin is warm and dry  Neurological:      Mental Status: She is alert and oriented to person, place, and time  Psychiatric:         Behavior: Behavior normal          I have personally reviewed pertinent films in PACS and my interpretation is as follows:  X-rays are reviewed of the right knee taken today demonstrate a well-centered patella on the sunrise due as well as no obvious other pathology noted on AP and lateral views  There is very slight fragmentation of bone along the medial aspect of the patella from previous surgery for reconstruction of the MPFL  This document was created using speech voice recognition software  Grammatical errors, random word insertions, pronoun errors, and incomplete sentences are an occasional consequence of this system due to software limitations, ambient noise, and hardware issues  Any formal questions or concerns about content, text, or information contained within the body of this dictation should be directly addressed to the provider for clarification

## 2023-04-20 NOTE — PROGRESS NOTES
Patient: Marco Matthews Date: 2023   : 1950    73 year old male      OUTPATIENT WOUND CARE PROGRESS NOTE    Supervising Wound Care / Hyperbaric Medicine Physician: Dr. Edgar Gomes  Consulting Provider:  Marco Penaloza MD  Date of Consultation/Last Comprehensive Exam:  2023  Referring  Provider:  Norris Burton DPM    SUBJECTIVE:    Chief Complaint:  Nonhealing toe amputation site      Wound/Ulcer Present:    Diabetic lower extremity ulcer:  Akhtar grade 3 (deep ulcer with abscess or osteomyelitis)  Does the patient have a plantar wound?   No.    Diabetic foot exam performed?  Yes.     Current Vascular Assessment:  Physical exam and Doppler.     Current Antibiotic Regimen:  Oral,Doxycycline.     Current Offloading Modality:  None.    Additional Wound Category:  Non-healing surgical wound     Maximum Baseline Ambulatory Status:  Ambulates unassisted    History of Present Illness:  This is a 73 year old male with type 2 diabetes, hyperlipidemia, and COPD who presents to [facility] for assessment of nonhealing left great toe amputation site wound.    Patient had left great toe amputation with Dr. Gordon Sorenson at [place] on 22. Patient reports that the site has remained open since the surgery. It has intermittently had foul odor and mild drainage in the ensuing months. He has seen his outpatient podiatrist, Dr. Burton, since then, who on 22 debrided the wound, applied Iodosorb, and referred him to our wound care service. Between seeing Dr. Burton and presenting to [facility], patient saw his PCP, Dr. Simon, on 23. There was clinical concern for left foot cellulitis, and patient was started on a course of doxycycline.    Patient currently smokes one pack per day (started at age 17). States he does not check his blood sugars, nor does he take his prescribed diabetes medications. Last A1C prior to initial consult was 12.0 (23). At time of consultation, he reports not having showered for over a year  Daily Note     Today's date: 2018  Patient name: Kita Rabago  : 2000  MRN: [id number]  Referring provider: Abigail Harry MD  Dx:   Encounter Diagnosis   Name Primary?  Right shoulder pain, unspecified chronicity Yes                  Subjective: Pt reports 2 /10 pain today  Objective: See treatment diary below    Precautions -none    Specialty Daily Treatment Diary     Manual  18    STM to Right UT 5 min 5 min 5 min 5 min    PROM Right shld 2 min 2 min 1 min 1 min    Mobs for ST upward rotation 3 min 3 min 4 min 4 min                        Exercise Diary         NuStep- UE 10 min 10 min 10 min 10 min    Tband rows Blue   20x 20x Blue   30x 30 #   30x    Tband step outs Red   20x Red   5x Green   10x Green 10x    Push/pull sled 10 lbs   5x   20  #   5x    T and Y over ball 20x 20x 1 #  20x 1 #   20x    Planks 5x 10" hold   10x 10" hold    10x 10 " hold  10x    S/L ER 20x 20x 20x 20x    S/L rotation 5x 10x 5x 10x    TG pull-ups Lev 8   10x Lev 9   30x Lev 10   30x Lev 12   30x    Lat pull down  30 #   30x 30#  30x 40 #  30x    Shld ext in half kneel  (CC)    50 #  20x                                                                                Modalities        CP deferred 5 min  deferred            Visit # 2 3 4 5            Assessment:  Weakness Right shld with eccentric and concentric ER MREs       Plan: Continue PT due to disinterest or \"not having the time.\" Denies recent fevers.    Interval History 4/20/2023  Pt seen for outpt follow up, he has now transferred to [facility] wound clinic after previously being followed at the [facility].  Pt has not scheduled/completed previously ordered arterial duplex.  Pt continues to smoke 1.5 ppd  Pt showers/self hygiene infrequently  Pt often does not change dressing between visits and misses appts regularly  Cannot have VNA as pt will not let them in, pt refuses to do own wound cares, only option is RN changes in clinic, but pt is inconsistent  Pt has DM, does not take his medicines and does not monitor blood glucose    Care has been palliative given above.    Current Treatment Regimen:  Dressing:  see below   Frequency:  see below   Changed by:  see below    Review of Systems:  Pertinent items are noted in HPI (history of present illness).    Past Medical History:   Diagnosis Date   • Acute bronchitis    • Arthritis    • Bronchitis    • Carpal tunnel syndrome, bilateral    • Cataract     bilat   • Chronic pain    • Diabetes mellitus (CMD)    • Diabetic neuropathy (CMD)     hands and feet   • Diabetic peripheral neuropathy (CMD)    • Edentulous    • Emphysema    • Fracture     toes   • Gastroesophageal reflux disease    • Glaucoma     lt eye   • High cholesterol    • Left shoulder pain    • Sleep apnea      testing sept 2012, \"did not stop breathing enought to need machine\"     Past Surgical History:   Procedure Laterality Date   • Carpal tunnel release  3/21/14    right carpal tunnel release   • Cataract extraction w/  intraocular lens implant  07/2018   • Hand surgery  early 2000's    right fifth \"knuckle\"    • Inguinal hernia repair  01/01/1983    left (yrs ago)and right (2013)     Social History     Socioeconomic History   • Marital status: Single     Spouse name: Not on file   • Number of children: Not on file   • Years of education: Not on file   • Highest education level: Not on  file   Occupational History   • Not on file   Tobacco Use   • Smoking status: Every Day     Current packs/day: 1.00     Average packs/day: 1 pack/day for 62.6 years (62.6 pk-yrs)     Types: Cigarettes     Start date: 9/6/1960   • Smokeless tobacco: Never   Vaping Use   • Vaping status: never used   Substance and Sexual Activity   • Alcohol use: Not Currently     Alcohol/week: 0.0 standard drinks of alcohol     Comment: special occasions   • Drug use: No   • Sexual activity: Not Currently   Other Topics Concern   • Not on file   Social History Narrative   • Not on file     Social Determinants of Health     Financial Resource Strain: Not on file   Food Insecurity: Not on file   Transportation Needs: Not on file   Physical Activity: Not on file   Stress: Not on file   Social Connections: Not on file   Intimate Partner Violence: Not At Risk (7/25/2020)    Intimate Partner Violence    • Social Determinants: Intimate Partner Violence Past Fear: No    • Social Determinants: Intimate Partner Violence Current Fear: No     Family History   Problem Relation Age of Onset   • Kidney disease Father    • Substance abuse Father         alcohol   • Stroke/TIA Brother    • Diabetes Maternal Aunt        Current Outpatient Medications   Medication Sig   • melatonin 5 MG Take 1 tablet by mouth nightly.   • glimepiride (AMARYL) 1 MG tablet Take 0.5 tablets by mouth daily (before breakfast).   • pregabalin (LYRICA) 300 MG capsule Take 1 capsule by mouth 2 times daily.   • metFORMIN (GLUCOPHAGE) 500 MG tablet Take 1 tablet by mouth 2 times daily (with meals).   • simvastatin (ZOCOR) 20 MG tablet Take 1 tablet by mouth at bedtime.   • baclofen (LIORESAL) 20 MG tablet Take 1 tablet by mouth 2 times daily as needed (muscle spasm).   • metoCLOPramide (REGLAN) 5 MG tablet Take 1 tablet by mouth 4 times daily as needed for Nausea.   • buPROPion (WELLBUTRIN SR) 150 MG 12 hr tablet Take 1 tablet by mouth 2 times daily.   • mupirocin (BACTROBAN) 2  % ointment    • blood glucose meter Test blood sugar 2 times daily. Diagnosis: E11.9. Meter: Freestyle   • blood glucose lancets Test blood sugar 2 times daily. Diagnosis: E11.9. Meter: Freestyle   • blood glucose test strip Test blood sugar 2 times daily. Diagnosis: E11.0. Meter: Freestyle   • travoprost, benzalkonium free, (TRAVATAN Z) 0.004 % ophthalmic solution    • diclofenac (VOLTAREN) 75 MG EC tablet Take 1 tablet by mouth daily.   • acetaminophen (TYLENOL) 325 MG tablet Take 1 tablet by mouth every 4 hours as needed for Pain. May take 1-2 tablets as needed for discomfort.   • ibuprofen (MOTRIN) 200 MG tablet Take 200 mg by mouth every 6 hours as needed for Pain.     Current Facility-Administered Medications   Medication   • SILVER NITRATE-POT NITRATE 75-25 % EX MISC Pyxis Override        ALLERGIES:  Patient has no known allergies.    OBJECTIVE:  Vital Signs:    Visit Vitals  /66 (BP Location: LUE - Left upper extremity, Patient Position: Sitting)   Pulse 78   Temp 97 °F (36.1 °C) (Temporal)   Ht 5' 9\" (1.753 m)   Wt 78.9 kg (173 lb 15.1 oz)   BMI 25.69 kg/m²       Physical Exam:  General appearance: Alert, in no distress and cooperative  Head:   normocephalic without obvious abnormality  Pulmonary: normal respiratory effort    Wound/Lower Extremity Exam  Strong odor of tobacco  Left foot great toe amputation site with full-thickness wound.   Mild malodor.   Base is covered by slough and necrotic nonviable tissue burden, debrided to viable base  Base is dermis with small fissure-type wounds  Mild bleeding  Bone is not directly palpable   Periwound has significant burden of dark HPK callus  No induration, erythema, or undue warmth.  Easily palpable DP and PT pulses.    Pre-debridement      Post debridement           3/30/2023        Wound Bed Quality:  As above      Mary-wound Quality:    As above    Additional Descriptors:  As above    Wound Measurements Per Flowsheet:       Wound Toe, great Left  Amputation Surgical Wound (Active)   Wound Length (cm) 1.5 cm 04/20/23 1326   Wound Width (cm) 2.5 cm 04/20/23 1326   Wound Depth (cm) 0.9 cm 04/20/23 1326   Wound Surface Area (cm^2) 3.75 cm^2 04/20/23 1326   Wound Volume (cm^3) 3.375 cm^3 04/20/23 1326   Number of days: 98     PROCEDURE:  Debridement: Selective Non-Excisional Debridement of Non-viable Tissue.  Informed consent obtained.  Time out was completed.  Patient, procedure, and site verified.  Anesthesia-  None  Size-  Less than or equal to 20 sq cm  Total debrided area was less than 20 sq cm    Instrument-  Scissors  Non-viable tissue removed-  Fibrin/Slough and Epidermis/dermis  Hemostasis achieved-  Pressure  Patient procedure was-  tolerated well, no complications.  Refer to nursing notes for wound dimensions and assessment.  Patient stable upon completion of procedure.  Wound dimensions unchanged post procedure.    Procedure was Performed by:  Physician     Laboratory assessments reviewed:  No results found for: PAB   Albumin (g/dL)   Date Value   10/13/2021 2.8 (L)   09/17/2019 3.3 (L)   09/12/2019 3.1 (L)      No results available in last 24 hours    Lab Results   Component Value Date    WBC 6.6 10/13/2021    GLUCOSE 283 (H) 10/13/2021    HGBA1C 12.0 (H) 01/05/2023    CRP 3.4 (H) 09/12/2019    RESR 14 09/12/2019    CREATININE 0.75 10/13/2021    GFRA >90 09/17/2019    GFRNA >90 09/17/2019        Culture results:  Specimen Description (no units)   Date Value   09/17/2019 BLOOD, PERIPHERAL LEFT HAND   09/17/2019 BLOOD, PERIPHERAL LEFT BLOOD, ANTECUBITAL   09/12/2019 BLOOD, PERIPHERAL RT BLOOD, ANTECUBITAL   09/12/2019 BLOOD LEFT FOREARM    CULTURE (no units)   Date Value   09/17/2019 NO GROWTH 5 DAYS.   09/17/2019 NO GROWTH 5 DAYS.   09/12/2019 NO GROWTH 5 DAYS.   09/12/2019 NO GROWTH 5 DAYS.        Diagnostic Assessments Reviewed:  X -ray (s)     Left Foot Xray (12/5/22)  IMPRESSION:     Recent postoperative change reflecting first digital amputation.  There is some new (compared with 2019) periosteal reaction or thickening involving the distal first metatarsal, probably postoperative in nature (relatively chronic changes of osteomyelitis is remains possible with this appearance, but no osseous erosions at this level).    Nutritional Assessment:  Prealbumin and/or Albumin reviewed    Wound treatment goals are palliative:  Yes 3/15/2023    DIAGNOSES:  Diabetic lower extremity ulcer, Akhtar grade 3 (deep ulcer with abscess or osteomyelitis) left great toe amputation site    Non-healing surgical wound left great toe amputation site  Diabetes mellitus  Smoker    Medical Decision Makin year old diabetic man, current smoker (1.5 ppd), with non-healing left great toe amputation site nonhealing surgical wound. Patient's diabetes is poorly controlled, and there is exposed bone at time of initial consultation; also active tobacco use. Explained to patient that he is at high risk for subsequent higher-level amputation, reviewed possibility of 1st MTH removal vs TMA vs BKA vs AKA risks.      3/15/2023 - Care is palliative given above and have informed pt risk of nonhealing and risk of future limb loss unless he were to change his effort/compliance with cares/recommendations. pt cannot have VNA, he will not let them into home, refuses to do own dressign changes.  pt seen for follow up, wound is overall similar.  Pt has not shceduled previously ordered MRI or arterial duplex.  Continues to smoke (encouraged cessation and reinforced risk of limb loss); given pt has not been compliant with self cares/wound cares, reinforced need for cares and risk of limb loss.  Ok to follow up outside of [facility] clinic    23 - Pt wound chronic, nonhealing, no efforts made at DM control, smoking reduction/cessation, consistency with follow up, making appts for imaging/vascular/surgical follow ups.  Wound cares remains palliative given above. Have informed pt risk of nonhealing, risk of  infection/sepsis, and risk of future limb loss.  Pt informed that without a change in his efforts, he should expect no change in outcome.    Needs better callus maintenance    Wound Care  LLE DFU - remove old dressing, wash with soap and water, pat dry.  Start Ioplex, dry cover dressing, change twice a week with RN clinic    Vascular  -1/12/23: Palpable left-sided DP and PT pulses with biphasic Doppler waveforms. Arterial duplex ultrasound of left lower extremity ordered.  Pt has not scheduled arterial duplex yet, encouraged to do so    Infectious Disease  -1/12/23: Palpable bone in wound base, clinical concern for osteomyelitis is high. MRI left foot previously ordered - never scheduled.  Pt on PO doxy 1/5/2023 - 1/19/2023    Endocrine  -Patient is diabetic.  Hemoglobin A1C (%)   Date Value   01/05/2023 12.0 (H)   -Goal blood glucose <150 mg/dL for optimal wound healing.    Nutrition  -Evidence of possible protein malnutrition by laboratory assay, though this is a nonspecific finding.  Protein, Total (g/dL)   Date Value   10/13/2021 7.5     Albumin (g/dL)   Date Value   10/13/2021 2.8 (L)   -Consume high-protein diet for optimal wound healing.    Surgical  -1/12/23: Patient to see Dr. Burton on 1/16/23 for reassessment, appreciate input given patient's high-risk status for further amputation.  Pt missed his DPM appt 1/16/2023 and DPM sent letter for him to reschedule, this has not been done yet, encouraged to do so    Hyperbaric  -Patient currently not a hyperbaric candidate.  He has had >30 days of cares, but has not followed through on all efforts directed at wound cares.      Other   consult sent 2/2/2023; Pt is not interested in their services    Follow up in clinic 2 weeks    Plan of Care:  Advanced Wound Care Recommendations:  As above  Percent Wound Closure from consult:  N/A (Initial consult 1/12/23)  Care plan to augment wound closure:  Not applicable.  Initial consult 1/12/23.    Patient  stable. All questions were answered.       Connie Maldonado MD

## 2023-09-29 ENCOUNTER — TELEPHONE (OUTPATIENT)
Age: 23
End: 2023-09-29

## 2023-09-29 ENCOUNTER — APPOINTMENT (OUTPATIENT)
Dept: RADIOLOGY | Facility: HOSPITAL | Age: 23
End: 2023-09-29
Payer: COMMERCIAL

## 2023-09-29 ENCOUNTER — OFFICE VISIT (OUTPATIENT)
Dept: OBGYN CLINIC | Facility: CLINIC | Age: 23
End: 2023-09-29
Payer: COMMERCIAL

## 2023-09-29 ENCOUNTER — APPOINTMENT (OUTPATIENT)
Dept: RADIOLOGY | Facility: CLINIC | Age: 23
End: 2023-09-29
Payer: COMMERCIAL

## 2023-09-29 VITALS
HEART RATE: 68 BPM | DIASTOLIC BLOOD PRESSURE: 69 MMHG | HEIGHT: 67 IN | WEIGHT: 116 LBS | SYSTOLIC BLOOD PRESSURE: 104 MMHG | BODY MASS INDEX: 18.21 KG/M2

## 2023-09-29 DIAGNOSIS — M79.605 LEFT LEG PAIN: ICD-10-CM

## 2023-09-29 DIAGNOSIS — N91.2 FEMALE ATHLETE TRIAD: ICD-10-CM

## 2023-09-29 DIAGNOSIS — F50.9 FEMALE ATHLETE TRIAD: ICD-10-CM

## 2023-09-29 DIAGNOSIS — M81.8 FEMALE ATHLETE TRIAD: ICD-10-CM

## 2023-09-29 DIAGNOSIS — M84.362A STRESS FRACTURE, LEFT TIBIA, INITIAL ENCOUNTER FOR FRACTURE: Primary | ICD-10-CM

## 2023-09-29 PROCEDURE — 99214 OFFICE O/P EST MOD 30 MIN: CPT | Performed by: ORTHOPAEDIC SURGERY

## 2023-09-29 PROCEDURE — 73590 X-RAY EXAM OF LOWER LEG: CPT

## 2023-09-29 NOTE — TELEPHONE ENCOUNTER
Caller: Patient    Doctor: Dr. Carol Ann Flores    Reason for call: Patient calling asking if she would still be able to swim and cycle?   Patient asking to speak to clinical team.    Call back#: [phone]

## 2023-09-29 NOTE — PROGRESS NOTES
Ortho Sports Medicine New Patient Visit     Assesment:   26 y.o. 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 Because the patient is having pain with ambulation, we discussed that she can use the crutches to prevent putting any additional stress on the tibia from weight bearing. This may be a proactive step if she is found to have a stress fracture, in which case we would recommend a period of non-weight bearing status. However, the patient prefers to remain weightbearing as tolerated at this time. I did encourage her to use crutches and go to nonweightbearing if her pain significant worsens when walking around. . The patient does have a [employer] meet this weekend and we discussed that because of risk of further injury, I recommend she not participate. The patient is agreeable to this. In the meantime, the patient can use ice and over-the-counter medications as needed. 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 I recommend additional workup for this with lab work, DXA scan to evaluate bone mineral density, and a referral to a Nutritionist. We discussed that although she has a high protein diet, she may not have a high enough caloric intake based on her energy output. The patient is agreeable to this and we will review workup upon completion. Follow up:    Return for Follow up after imaging. Chief Complaint   Patient presents with   • Left Lower Leg - Pain     Shine splints   Went to urgent care to get x-ray was referred here       History of Present Illness: The patient is a 26 y.o. female presenting for initial evaluation of left lower shin pain for about 3 weeks.  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 She states she ran every day from April until recently when she took a 5 day rest period. Chilo Albarado reports running about 40 miles weekly then decreasing her mileage to 20-30 miles recently due to pain. Her goal was to get up to 60 miles per week, but she has been unable to do so due to pain. She localizes pain to the distal third of the tibia and states pain has increased such that she feels discomfort with walking and now at rest. The patient presented to Urgent Care for x-rays, which are not available for review today. She was provided crutches, but has not been using them. The patient denies history of injury or prior similar symptoms, swelling, locking episodes, instability, weakness, patellar instability, or numbness/tingling. The patient is not currently working with athletic trainers at school or attended formal PT. She is not currently using any home therapies. Of note, the patient does note longer periods between menstrual cycles. She notes this started as she increased her mileage. The patient reports a healthy, clean, high protein diet. She denies any diet changes recently. The patient has not worked with a nutritionist so far. Knee Surgical History:  Right MPFL reconstruction    Past Medical, Social and Family History:  Past Medical History:   Diagnosis Date   • ADHD    • Asthma    • Pernio    • Raynaud disease      Past Surgical History:   Procedure Laterality Date   • HAND SURGERY      ganglion cyst   • [place] ARTHROSCOPY KNEE DIAGNOSTIC W/WO SYNOVIAL BX SPX Right 5/21/2020    Procedure: ARTHROSCOPY KNEE;  Surgeon: Earline Yo MD;  Location: [facility];  Service: Orthopedics   • [place] LIGAMENTOUS RECONSTRUCTION KNEE EXTRA-ARTICULAR Right 5/21/2020    Procedure: KNEE DIAGNOSTIC ARTHROSCOPY WITH OPEN MEDIAL PATELLOFEMORAL LIGAMENT RECONSTRUCTION WITH HAMSTRING AUTOGRAFT (MPFL);   Surgeon: Earline Yo MD;  Location: [facility];  Service: Orthopedics     Allergies   Allergen Reactions   • Betadine [Povidone Iodine]      HIVES   • Celebrex [Celecoxib] Hives   • Nuts - Food Allergy    • Other Hives     PEANUTS, TREENUTS, CELERY   • Soya Lecithin [Lecithin]      Current Outpatient Medications on File Prior to Visit   Medication Sig Dispense Refill   • albuterol (PROVENTIL HFA,VENTOLIN HFA) 90 mcg/act inhaler Inhale 2 puffs every 6 (six) hours as needed for wheezing     • amoxicillin (AMOXIL) 500 MG tablet      • budesonide-formoterol (SYMBICORT) 160-4.5 mcg/act inhaler Inhale 2 puffs 2 (two) times a day Rinse mouth after use. • chlorhexidine (PERIDEX) 0.12 % solution      • EPINEPHrine (EPIPEN) 0.3 mg/0.3 mL SOAJ      • methylphenidate (CONCERTA) 18 mg ER tablet Take 18 mg by mouth daily as needed      • olopatadine (PATANOL) 0.1 % ophthalmic solution        No current facility-administered medications on file prior to visit. Social History     Socioeconomic History   • Marital status: Single     Spouse name: Not on file   • Number of children: Not on file   • Years of education: Not on file   • Highest education level: Not on file   Occupational History   • Not on file   Tobacco Use   • Smoking status: Never   • Smokeless tobacco: Never   Vaping Use   • Vaping Use: Never used   Substance and Sexual Activity   • Alcohol use: No   • Drug use: No   • Sexual activity: Not on file   Other Topics Concern   • Not on file   Social History Narrative   • Not on file     Social Determinants of Health     Financial Resource Strain: Not on file   Food Insecurity: Not on file   Transportation Needs: Not on file   Physical Activity: Not on file   Stress: Not on file   Social Connections: Not on file   Intimate Partner Violence: Not on file   Housing Stability: Not on file         I have reviewed the past medical, surgical, social and family history, medications and allergies as documented in the EMR.     Review of systems: ROS is negative other than that noted in the HPI. Constitutional: Negative for fatigue and fever. HENT: Negative for sore throat. Respiratory: Negative for shortness of breath. Cardiovascular: Negative for chest pain. Gastrointestinal: Negative for abdominal pain. Endocrine: Negative for cold intolerance and heat intolerance. Genitourinary: Negative for flank pain. Musculoskeletal: Negative for back pain. Skin: Negative for rash. Allergic/Immunologic: Negative for immunocompromised state. Neurological: Negative for dizziness. Psychiatric/Behavioral: Negative for agitation. Physical Exam:    Blood pressure 104/69, height 5' 7" (1.702 m), weight 52.6 kg (116 lb), not currently breastfeeding. General/Constitutional: NAD, well developed, well nourished  HENT: Normocephalic, atraumatic  CV: Intact distal pulses, regular rate  Resp: No respiratory distress or labored breathing  Abdomen: soft, nondistended   Lymphatic: No lymphadenopathy palpated  Neuro: Alert and Oriented x 3, no focal deficits  Psych: Normal mood, normal affect  Skin: Warm, dry, no rashes, no erythema      Knee Exam:   No significant skin lesions or deformity  No joint effusion  Range of motion from 0 to 140 without pain  No joint line tenderness   Moderate tenderness along the proximal and distal thirds of the tibia. No other bony tenderness. Knee is stable to varus stress, valgus stress, Lachman, and posterior drawer. Neurovascularly intact distally    Knee Imaging    X-rays of the left knee reviewed and interpreted today. These show no acute osseous abnormalities. No indication of cortical thickening. No significant degenerative changes. The patella is well-centered on the trochlea.

## 2023-10-03 ENCOUNTER — HOSPITAL ENCOUNTER (OUTPATIENT)
Dept: MRI IMAGING | Facility: HOSPITAL | Age: 23
Discharge: HOME | End: 2023-10-03
Payer: COMMERCIAL

## 2023-10-03 DIAGNOSIS — M81.8 FEMALE ATHLETE TRIAD: ICD-10-CM

## 2023-10-03 DIAGNOSIS — F50.9 FEMALE ATHLETE TRIAD: ICD-10-CM

## 2023-10-03 DIAGNOSIS — N91.2 FEMALE ATHLETE TRIAD: ICD-10-CM

## 2023-10-03 DIAGNOSIS — M84.362A STRESS FRACTURE, LEFT TIBIA, INITIAL ENCOUNTER FOR FRACTURE: ICD-10-CM

## 2023-10-03 PROCEDURE — 73718 MRI LOWER EXTREMITY W/O DYE: CPT

## 2023-10-03 PROCEDURE — G1004 CDSM NDSC: HCPCS

## 2023-10-03 NOTE — TELEPHONE ENCOUNTER
I called patient x3 and LMOM to call back so we can relay below message. Advised this would be our last phone call to her.

## 2023-10-05 ENCOUNTER — TELEPHONE (OUTPATIENT)
Dept: NUTRITION | Facility: HOSPITAL | Age: 23
End: 2023-10-05

## 2023-10-05 NOTE — TELEPHONE ENCOUNTER
Called pt to discuss Sports Nutrition needs. Left VM for pt to call back- [address] [facility] MS, RD, James, LDN  Sports Dietitian Specialist  [address] [place] [address] [email or web address]

## 2023-10-06 ENCOUNTER — OFFICE VISIT (OUTPATIENT)
Dept: OBGYN CLINIC | Facility: CLINIC | Age: 23
End: 2023-10-06
Payer: COMMERCIAL

## 2023-10-06 VITALS
BODY MASS INDEX: 18.21 KG/M2 | HEART RATE: 68 BPM | HEIGHT: 67 IN | WEIGHT: 116 LBS | SYSTOLIC BLOOD PRESSURE: 100 MMHG | DIASTOLIC BLOOD PRESSURE: 66 MMHG

## 2023-10-06 DIAGNOSIS — M84.362A STRESS FRACTURE, LEFT TIBIA, INITIAL ENCOUNTER FOR FRACTURE: Primary | ICD-10-CM

## 2023-10-06 PROCEDURE — 99214 OFFICE O/P EST MOD 30 MIN: CPT | Performed by: ORTHOPAEDIC SURGERY

## 2023-10-06 RX ORDER — AZELASTINE HYDROCHLORIDE, FLUTICASONE PROPIONATE 137; 50 UG/1; UG/1
SPRAY, METERED NASAL
COMMUNITY
Start: 2023-04-17

## 2023-10-06 NOTE — PROGRESS NOTES
Assessment/Plan:  1. Stress fracture, left tibia, initial encounter for fracture          Scribe Attestation    I,:  Marce Gaona Call am acting as a scribe while in the presence of the attending physician.:       I,:  Silverio Valencia MD personally performed the services described in this documentation    as scribed in my presence.:             Singh Machuca and I reviewed her MRI together. There is evidence of a grade 3 stress injury in the distal left tibial shaft. In my opinion this should be treated as a fracture. The most appropriate care would be initiating nonweightbearing status with crutches. I understand this is difficult for her while attending classes at [employer] [facility] [employer]. I do recommend obtaining a handicap placard for her car. She states parking is nonexistent at [employer] [facility] [employer] and deferred this option. I also recommended the use of a scooter. She deferred this as well. She will make an effort to use her crutches for now. Typically this will be 6 weeks of nonweightbearing and she should begin to show improvement. I cautioned her that she continue to bear weight on the left lower extremity this can cause condition to worsen and cause a fracture. Should a fracture develop she may require surgery which would include a surgical jeffery and extended stay in the hospital.  She would be limited for running for months. She verbally stated she understood. I encouraged her to have the blood work performed and follow-up with the nutritionist.  We did discuss that there is an abnormality that suggested a grade 2/3 stress injury of the right proximal tibia. That is asymptomatic today. She denies right leg pain. I will see her back in 6 weeks. Subjective:   Hugo Estrella is a 26 y.o. female who presents for follow-up evaluation of the suspected left tibial stress fracture. At last visit an MRI was ordered. She has had the study completed and we will discuss those results today.     At last visit Singh Machuca was placed on crutches and instructed to be nonweightbearing. She admits that this is quite difficult as she has a long walking commute while attending classes at [employer]. She continues to have pain with bearing weight on the left lower extremity at the middle third of the left tibia. She has not been running since her last visit. Previously there is labs ordered as well as referral to a nutritionist.  Abhinav Meza has not had a blood studies completed yet. She admits to receiving a message from the nutritionist but has not been able to connect. She denies pain in the right leg. Review of Systems   Constitutional: Negative for chills, fever and unexpected weight change. HENT: Negative for hearing loss, nosebleeds and sore throat. Eyes: Negative for pain, redness and visual disturbance. Respiratory: Negative for cough, shortness of breath and wheezing. Cardiovascular: Negative for chest pain, palpitations and leg swelling. Gastrointestinal: Negative for abdominal pain, nausea and vomiting. Endocrine: Negative for polydipsia and polyuria. Genitourinary: Negative for dysuria and hematuria. Musculoskeletal:        See HPI   Skin: Negative for rash and wound. Neurological: Negative for dizziness, numbness and headaches. Psychiatric/Behavioral: Negative for decreased concentration and suicidal ideas. The patient is not nervous/anxious. Past Medical History:   Diagnosis Date   • ADHD    • Asthma    • Pernio    • Raynaud disease        Past Surgical History:   Procedure Laterality Date   • HAND SURGERY      ganglion cyst   • [place] ARTHROSCOPY KNEE DIAGNOSTIC W/WO SYNOVIAL BX SPX Right 05/21/2020    Procedure: ARTHROSCOPY KNEE;  Surgeon: Eric Sheldon MD;  Location: [facility] [place];  Service: Orthopedics   • [place] LIGAMENTOUS RECONSTRUCTION KNEE EXTRA-ARTICULAR Right 05/21/2020    Procedure: KNEE DIAGNOSTIC ARTHROSCOPY WITH OPEN MEDIAL PATELLOFEMORAL LIGAMENT RECONSTRUCTION WITH HAMSTRING AUTOGRAFT (MPFL);   Surgeon: Eric Sheldon MD;  Location: [facility];  Service: Orthopedics   • TENDON REPAIR  05/2020       Family History   Problem Relation Age of Onset   • No Known Problems Mother    • No Known Problems Father    • No Known Problems Sister    • No Known Problems Brother    • No Known Problems Maternal Aunt    • No Known Problems Maternal Uncle    • No Known Problems Paternal Aunt    • No Known Problems Paternal Uncle    • No Known Problems Maternal Grandmother    • No Known Problems Maternal Grandfather    • No Known Problems Paternal Grandmother    • No Known Problems Paternal Grandfather        Social History     Occupational History   • Not on file   Tobacco Use   • Smoking status: Never   • Smokeless tobacco: Never   Vaping Use   • Vaping Use: Never used   Substance and Sexual Activity   • Alcohol use: No   • Drug use: No   • Sexual activity: Not Currently     Partners: Male     Birth control/protection: Condom Male         Current Outpatient Medications:   •  albuterol (PROVENTIL HFA,VENTOLIN HFA) 90 mcg/act inhaler, Inhale 2 puffs every 6 (six) hours as needed for wheezing, Disp: , Rfl:   •  Azelastine-Fluticasone 137-50 MCG/ACT SUSP, SPRAY 1 SPRAY INTO EACH NOSTRIL TWICE A DAY, Disp: , Rfl:   •  EPINEPHrine (EPIPEN) 0.3 mg/0.3 mL SOAJ, , Disp: , Rfl:   •  olopatadine (PATANOL) 0.1 % ophthalmic solution, , Disp: , Rfl:   •  amoxicillin (AMOXIL) 500 MG tablet, , Disp: , Rfl:   •  budesonide-formoterol (SYMBICORT) 160-4.5 mcg/act inhaler, Inhale 2 puffs 2 (two) times a day Rinse mouth after use.  (Patient not taking: Reported on 10/6/2023), Disp: , Rfl:   •  chlorhexidine (PERIDEX) 0.12 % solution, , Disp: , Rfl:   •  methylphenidate (CONCERTA) 18 mg ER tablet, Take 18 mg by mouth daily as needed , Disp: , Rfl:     Allergies   Allergen Reactions   • Betadine [Povidone Iodine]      HIVES   • Celebrex [Celecoxib] Hives   • Nuts - Food Allergy Other (See Comments)   • Other Hives     PEANUTS, TREENUTS, CELERY   • Soya Lecithin [Lecithin] Other (See Comments)     NO KNOWN REACTIONS   • Wheat Bran - Food Allergy Other (See Comments)       Objective:  Vitals:    10/06/23 0857   BP: 100/66   Pulse: 68       Right Knee Exam     Tenderness   Right knee tenderness location: Mild tenderness proximal third anterior tibia. Left Knee Exam     Tenderness   Left knee tenderness location: Distal medial third of the tibia. Physical Exam  Vitals reviewed. Constitutional:       Appearance: She is well-developed. HENT:      Head: Normocephalic and atraumatic. Eyes:      General:         Right eye: No discharge. Left eye: No discharge. Conjunctiva/sclera: Conjunctivae normal.   Cardiovascular:      Rate and Rhythm: Regular rhythm. Pulmonary:      Effort: Pulmonary effort is normal. No respiratory distress. Breath sounds: No stridor. Musculoskeletal:      Cervical back: Normal range of motion and neck supple. Skin:     General: Skin is warm and dry. Neurological:      Mental Status: She is alert and oriented to person, place, and time. Psychiatric:         Behavior: Behavior normal.         I have personally reviewed pertinent films in PACS and my interpretation is as follows:  MRI of the left tibia-fibula demonstrates a grade 3 stress injury to the distal left tibial shaft without evidence of fracture. There is evidence of a suspected grade 2-3 stress injury proximal right tibial shaft. This document was created using speech voice recognition software. Grammatical errors, random word insertions, pronoun errors, and incomplete sentences are an occasional consequence of this system due to software limitations, ambient noise, and hardware issues. Any formal questions or concerns about content, text, or information contained within the body of this dictation should be directly addressed to the provider for clarification.

## 2023-10-30 ENCOUNTER — OFFICE VISIT (OUTPATIENT)
Dept: OBGYN CLINIC | Facility: CLINIC | Age: 23
End: 2023-10-30
Payer: COMMERCIAL

## 2023-10-30 VITALS
WEIGHT: 116 LBS | BODY MASS INDEX: 18.21 KG/M2 | HEIGHT: 67 IN | SYSTOLIC BLOOD PRESSURE: 111 MMHG | DIASTOLIC BLOOD PRESSURE: 72 MMHG | HEART RATE: 92 BPM

## 2023-10-30 DIAGNOSIS — M84.362D STRESS FRACTURE, LEFT TIBIA, SUBSEQUENT ENCOUNTER FOR FRACTURE WITH ROUTINE HEALING: Primary | ICD-10-CM

## 2023-10-30 PROCEDURE — 99213 OFFICE O/P EST LOW 20 MIN: CPT | Performed by: ORTHOPAEDIC SURGERY

## 2023-10-30 NOTE — PROGRESS NOTES
Assessment/Plan:  1. Stress fracture, left tibia, subsequent encounter for fracture with routine healing          Scribe Attestation      I,:  Olga Boudreaux am acting as a scribe while in the presence of the attending physician.:       I,:  Wagner Sanchez MD personally performed the services described in this documentation    as scribed in my presence.:               Crissy Macario is doing very well. She has responded well to to rest.  She is no longer tender on the tibia and can bear weight without pain. She can continue without the crutches. She should not attempt jogging for additional 2 weeks. Her return to exercise should be very gradual and limited. If her volume is too high upon returning she will likely reinjure. She verbalized an understanding. I would like her to follow-up with me in 4 to 6 weeks. As her schedule lightens towards the end of the month I encouraged her to speak with a nutritionist and also have her lab  work completed. Subjective:   Geovanna Chowdhury is a 26 y.o. female who presents for follow-up evaluation of her left lower leg. Crissy Macario was diagnosed with a stress fracture located at the distal medial aspect of the left tibia at last visit. She was restricted to nonweightbearing status with use of crutches and had been 1 week prior. She returns today very happy to report that she is no longer experiencing pain in her left lower leg. She has discontinued the crutches last Wednesday. She states she has no pain whatsoever while walking or bearing weight on the left lower extremity. She has not attempted jogging or running. Previously referral was made to a nutritionist.  Crissy Macario has not had an opportunity to speak with the practitioner. Blood work had also been ordered. She states her schedule has been quite busy to get this accomplished. Review of Systems   Constitutional:  Negative for chills, fever and unexpected weight change.    HENT:  Negative for hearing loss, nosebleeds and sore throat. Eyes:  Negative for pain, redness and visual disturbance. Respiratory:  Negative for cough, shortness of breath and wheezing. Cardiovascular:  Negative for chest pain, palpitations and leg swelling. Gastrointestinal:  Negative for abdominal pain, nausea and vomiting. Endocrine: Negative for polydipsia and polyuria. Genitourinary:  Negative for dysuria and hematuria. Musculoskeletal:         See HPI   Skin:  Negative for rash and wound. Neurological:  Negative for dizziness, numbness and headaches. Psychiatric/Behavioral:  Negative for decreased concentration and suicidal ideas. The patient is not nervous/anxious. Past Medical History:   Diagnosis Date    ADHD     Asthma     Pernio     Raynaud disease        Past Surgical History:   Procedure Laterality Date    HAND SURGERY      ganglion cyst    [place] ARTHROSCOPY KNEE DIAGNOSTIC W/WO SYNOVIAL BX SPX Right 05/21/2020    Procedure: ARTHROSCOPY KNEE;  Surgeon: Nikita Monzon MD;  Location: [facility];  Service: Orthopedics    [place] LIGAMENTOUS RECONSTRUCTION KNEE EXTRA-ARTICULAR Right 05/21/2020    Procedure: KNEE DIAGNOSTIC ARTHROSCOPY WITH OPEN MEDIAL PATELLOFEMORAL LIGAMENT RECONSTRUCTION WITH HAMSTRING AUTOGRAFT (MPFL);   Surgeon: Nikita Monzon MD;  Location: [address] [place] [address];  Service: Orthopedics    TENDON REPAIR  05/2020       Family History   Problem Relation Age of Onset    No Known Problems Mother     No Known Problems Father     No Known Problems Sister     No Known Problems Brother     No Known Problems Maternal Aunt     No Known Problems Maternal Uncle     No Known Problems Paternal Aunt     No Known Problems Paternal Uncle     No Known Problems Maternal Grandmother     No Known Problems Maternal Grandfather     No Known Problems Paternal Grandmother     No Known Problems Paternal Grandfather        Social History     Occupational History    Not on file   Tobacco Use    Smoking status: Never    Smokeless tobacco: Never Vaping Use    Vaping Use: Never used   Substance and Sexual Activity    Alcohol use: No    Drug use: No    Sexual activity: Not Currently     Partners: Male     Birth control/protection: Condom Male         Current Outpatient Medications:     albuterol (PROVENTIL HFA,VENTOLIN HFA) 90 mcg/act inhaler, Inhale 2 puffs every 6 (six) hours as needed for wheezing, Disp: , Rfl:     amoxicillin (AMOXIL) 500 MG tablet, , Disp: , Rfl:     Azelastine-Fluticasone 137-50 MCG/ACT SUSP, SPRAY 1 SPRAY INTO EACH NOSTRIL TWICE A DAY, Disp: , Rfl:     budesonide-formoterol (SYMBICORT) 160-4.5 mcg/act inhaler, Inhale 2 puffs 2 (two) times a day Rinse mouth after use. (Patient not taking: Reported on 10/6/2023), Disp: , Rfl:     chlorhexidine (PERIDEX) 0.12 % solution, , Disp: , Rfl:     EPINEPHrine (EPIPEN) 0.3 mg/0.3 mL SOAJ, , Disp: , Rfl:     methylphenidate (CONCERTA) 18 mg ER tablet, Take 18 mg by mouth daily as needed , Disp: , Rfl:     olopatadine (PATANOL) 0.1 % ophthalmic solution, , Disp: , Rfl:     Allergies   Allergen Reactions    Betadine [Povidone Iodine]      HIVES    Celebrex [Celecoxib] Hives    Nuts - Food Allergy Other (See Comments)    Other Hives     PEANUTS, TREENUTS, CELERY    Soya Lecithin [Lecithin] Other (See Comments)     NO KNOWN REACTIONS    Wheat Bran - Food Allergy Other (See Comments)       Objective:  Vitals:    10/30/23 1151   BP: 111/72   Pulse: 92       Left Ankle Exam     Range of Motion   Dorsiflexion:  30   Plantar flexion:  45   Eversion:  normal   Inversion:  normal     Muscle Strength   The patient has normal left ankle strength. Other   Erythema: absent  Sensation: normal    Comments: The left tibia is nontender. There is no evidence of swelling or bruising. Strength/Myotome Testing     Left Ankle/Foot   Normal strength      Physical Exam  Vitals reviewed. Constitutional:       Appearance: She is well-developed. HENT:      Head: Normocephalic and atraumatic.    Eyes: General:         Right eye: No discharge. Left eye: No discharge. Conjunctiva/sclera: Conjunctivae normal.   Cardiovascular:      Rate and Rhythm: Regular rhythm. Pulmonary:      Effort: Pulmonary effort is normal. No respiratory distress. Breath sounds: No stridor. Musculoskeletal:      Cervical back: Normal range of motion and neck supple. Skin:     General: Skin is warm and dry. Neurological:      Mental Status: She is alert and oriented to person, place, and time. Psychiatric:         Behavior: Behavior normal.               This document was created using speech voice recognition software. Grammatical errors, random word insertions, pronoun errors, and incomplete sentences are an occasional consequence of this system due to software limitations, ambient noise, and hardware issues. Any formal questions or concerns about content, text, or information contained within the body of this dictation should be directly addressed to the provider for clarification.

## 2024-05-15 ENCOUNTER — TELEPHONE (OUTPATIENT)
Facility: HOSPITAL | Age: 24
End: 2024-05-15

## 2024-05-15 NOTE — TELEPHONE ENCOUNTER
Left VM regarding Clinical Sports Nutrition Referral. Insurance now accepted. Please call [phone] to schedule.    Bethany Olivares MS, RD, James, LDN  Sports Dietitian Specialist  [facility]- Sports Medicine  [email or web address]  [phone]

## 2024-06-17 DIAGNOSIS — Z00.6 ENCOUNTER FOR EXAMINATION FOR NORMAL COMPARISON OR CONTROL IN CLINICAL RESEARCH PROGRAM: ICD-10-CM

## 2024-06-26 ENCOUNTER — APPOINTMENT (OUTPATIENT)
Dept: LAB | Facility: HOSPITAL | Age: 24
End: 2024-06-26

## 2024-06-26 DIAGNOSIS — Z00.6 ENCOUNTER FOR EXAMINATION FOR NORMAL COMPARISON OR CONTROL IN CLINICAL RESEARCH PROGRAM: ICD-10-CM

## 2024-06-26 PROCEDURE — 36415 COLL VENOUS BLD VENIPUNCTURE: CPT

## 2024-09-16 ENCOUNTER — TELEPHONE (OUTPATIENT)
Age: 24
End: 2024-09-16

## 2024-09-16 NOTE — TELEPHONE ENCOUNTER
I tried to call pt to cancel their appt as the doctor is still on leave. I left a v/m to inform them that they will be placed on the wait list for when the doctor returns. I provided our number and asked the pt if they would like to call and see if our other offices have an any opening, they can however they are scheduled out pretty far already.

## 2024-09-26 ENCOUNTER — OFFICE VISIT (OUTPATIENT)
Dept: FAMILY MEDICINE CLINIC | Facility: CLINIC | Age: 24
End: 2024-09-26
Payer: COMMERCIAL

## 2024-09-26 VITALS
TEMPERATURE: 98.3 F | SYSTOLIC BLOOD PRESSURE: 110 MMHG | DIASTOLIC BLOOD PRESSURE: 62 MMHG | HEIGHT: 66 IN | WEIGHT: 123 LBS | BODY MASS INDEX: 19.77 KG/M2 | RESPIRATION RATE: 16 BRPM | HEART RATE: 76 BPM

## 2024-09-26 DIAGNOSIS — M35.9 AUTOIMMUNE CONNECTIVE TISSUE DISORDER (HCC): ICD-10-CM

## 2024-09-26 DIAGNOSIS — Z82.49 FAMILY HISTORY OF DILATED CARDIOMYOPATHY: ICD-10-CM

## 2024-09-26 DIAGNOSIS — Z00.00 ANNUAL PHYSICAL EXAM: Primary | ICD-10-CM

## 2024-09-26 DIAGNOSIS — Q79.60 EHLERS-DANLOS SYNDROME: ICD-10-CM

## 2024-09-26 PROBLEM — R42 POSTURAL DIZZINESS WITH NEAR SYNCOPE: Status: RESOLVED | Noted: 2022-07-05 | Resolved: 2024-09-26

## 2024-09-26 PROBLEM — R55 POSTURAL DIZZINESS WITH NEAR SYNCOPE: Status: RESOLVED | Noted: 2022-07-05 | Resolved: 2024-09-26

## 2024-09-26 PROBLEM — F90.2 ATTENTION DEFICIT HYPERACTIVITY DISORDER (ADHD), COMBINED TYPE: Status: ACTIVE | Noted: 2024-09-26

## 2024-09-26 PROBLEM — G90.A POTS (POSTURAL ORTHOSTATIC TACHYCARDIA SYNDROME): Status: RESOLVED | Noted: 2022-07-05 | Resolved: 2024-09-26

## 2024-09-26 PROBLEM — J45.20 MILD INTERMITTENT ASTHMA WITHOUT COMPLICATION: Status: ACTIVE | Noted: 2024-09-26

## 2024-09-26 PROBLEM — I73.00 RAYNAUD'S PHENOMENON WITHOUT GANGRENE: Status: RESOLVED | Noted: 2022-07-05 | Resolved: 2024-09-26

## 2024-09-26 PROCEDURE — 99385 PREV VISIT NEW AGE 18-39: CPT | Performed by: FAMILY MEDICINE

## 2024-09-26 NOTE — PATIENT INSTRUCTIONS
"Patient Education     Routine physical for adults   The Basics   Written by the doctors and editors at [facility]   What is a physical? -- A physical is a routine visit, or \"check-up,\" with your doctor. You might also hear it called a \"wellness visit\" or \"preventive visit.\"  During each visit, the doctor will:   Ask about your physical and mental health   Ask about your habits, behaviors, and lifestyle   Do an exam   Give you vaccines if needed   Talk to you about any medicines you take   Give advice about your health   Answer your questions  Getting regular check-ups is an important part of taking care of your health. It can help your doctor find and treat any problems you have. But it's also important for preventing health problems.  A routine physical is different from a \"sick visit.\" A sick visit is when you see a doctor because of a health concern or problem. Since physicals are scheduled ahead of time, you can think about what you want to ask the doctor.  How often should I get a physical? -- It depends on your age and health. In general, for people age 21 years and older:   If you are younger than 50 years, you might be able to get a physical every 3 years.   If you are 50 years or older, your doctor might recommend a physical every year.  If you have an ongoing health condition, like diabetes or high blood pressure, your doctor will probably want to see you more often.  What happens during a physical? -- In general, each visit will include:   Physical exam - The doctor or nurse will check your height, weight, heart rate, and blood pressure. They will also look at your eyes and ears. They will ask about how you are feeling and whether you have any symptoms that bother you.   Medicines - It's a good idea to bring a list of all the medicines you take to each doctor visit. Your doctor will talk to you about your medicines and answer any questions. Tell them if you are having any side effects that bother you. You " "should also tell them if you are having trouble paying for any of your medicines.   Habits and behaviors - This includes:   Your diet   Your exercise habits   Whether you smoke, drink alcohol, or use drugs   Whether you are sexually active   Whether you feel safe at home  Your doctor will talk to you about things you can do to improve your health and lower your risk of health problems. They will also offer help and support. For example, if you want to quit smoking, they can give you advice and might prescribe medicines. If you want to improve your diet or get more physical activity, they can help you with this, too.   Lab tests, if needed - The tests you get will depend on your age and situation. For example, your doctor might want to check your:   Cholesterol   Blood sugar   Iron level   Vaccines - The recommended vaccines will depend on your age, health, and what vaccines you already had. Vaccines are very important because they can prevent certain serious or deadly infections.   Discussion of screening - \"Screening\" means checking for diseases or other health problems before they cause symptoms. Your doctor can recommend screening based on your age, risk, and preferences. This might include tests to check for:   Cancer, such as breast, prostate, cervical, ovarian, colorectal, prostate, lung, or skin cancer   Sexually transmitted infections, such as chlamydia and gonorrhea   Mental health conditions like depression and anxiety  Your doctor will talk to you about the different types of screening tests. They can help you decide which screenings to have. They can also explain what the results might mean.   Answering questions - The physical is a good time to ask the doctor or nurse questions about your health. If needed, they can refer you to other doctors or specialists, too.  Adults older than 65 years often need other care, too. As you get older, your doctor will talk to you about:   How to prevent falling at " home   Hearing or vision tests   Memory testing   How to take your medicines safely   Making sure that you have the help and support you need at home  All topics are updated as new evidence becomes available and our peer review process is complete.  This topic retrieved from [employer] on: May 02, 2024.  Topic 225791 Version 1.0  Release: 32.4.3 - C32.122  © 2024 UpToDate, Inc. and/or its affiliates. All rights reserved.  Consumer Information Use and Disclaimer   Disclaimer: This generalized information is a limited summary of diagnosis, treatment, and/or medication information. It is not meant to be comprehensive and should be used as a tool to help the user understand and/or assess potential diagnostic and treatment options. It does NOT include all information about conditions, treatments, medications, side effects, or risks that may apply to a specific patient. It is not intended to be medical advice or a substitute for the medical advice, diagnosis, or treatment of a health care provider based on the health care provider's examination and assessment of a patient's specific and unique circumstances. Patients must speak with a health care provider for complete information about their health, medical questions, and treatment options, including any risks or benefits regarding use of medications. This information does not endorse any treatments or medications as safe, effective, or approved for treating a specific patient. UpToDate, Inc. and its affiliates disclaim any warranty or liability relating to this information or the use thereof.The use of this information is governed by the Terms of Use, available at https://www.woltersKnouwer.com/en/know/clinical-effectiveness-terms. 2024© UpToDate, Inc. and its affiliates and/or licensors. All rights reserved.  Copyright   © 2024 UpToDate, Inc. and/or its affiliates. All rights reserved.

## 2024-09-26 NOTE — PROGRESS NOTES
"Ambulatory Visit  Name: Tamera Sanders      : 2000      MRN: [id number]  Encounter Provider: Beulah Gibson DO  Encounter Date: 2024   Encounter department: [facility]    Assessment & Plan  Autoimmune connective tissue disorder (HCC)         Logan-Danlos syndrome            History of Present Illness   {Disappearing Hyperlinks I Encounters * My Last Note * Since Last Visit * History :77120}  HPI    {History obtained from (Optional):88776}  Review of Systems  {Select to Display PMH (Optional):21775}      Objective   {Disappearing Hyperlinks   Review Vitals * Enter New Vitals * Results Review * Labs * Imaging * Cardiology * Procedures * Lung Cancer Screening * Surgical eConsent :58137}  /62   Pulse 76   Temp 98.3 °F (36.8 °C)   Resp 16   Ht 5' 6\" (1.676 m)   Wt 55.8 kg (123 lb)   LMP  (Within Weeks) Comment: 2 weeks ago  BMI 19.85 kg/m²     Physical Exam  {Administrative / Billing Section (Optional):61542}  "

## 2024-09-26 NOTE — ASSESSMENT & PLAN NOTE
Has not seen her rheumatologist in 5-6 years  Orders:    Ambulatory referral to Rheumatology; Future

## 2024-09-26 NOTE — PROGRESS NOTES
Adult Annual Physical  Name: Tamera Sanders      : 2000      MRN: [id number]  Encounter Provider: Beulah Gibson DO  Encounter Date: 2024   Encounter department: [facility]    Assessment & Plan  Annual physical exam         Autoimmune connective tissue disorder (HCC)    Orders:    Ambulatory referral to Rheumatology; Future    Logan-Danlos syndrome  Has not seen her rheumatologist in 5-6 years  Orders:    Ambulatory referral to Rheumatology; Future    Family history of dilated cardiomyopathy    Orders:    Ambulatory referral to Cardiology; Future      Immunizations and preventive care screenings were discussed with patient today. Appropriate education was printed on patient's after visit summary.    Counseling:  Dental Health: discussed importance of regular tooth brushing, flossing, and dental visits.  Exercise: the importance of regular exercise/physical activity was discussed. Recommend exercise 3-5 times per week for at least 30 minutes.   Declined flu vaccine   Recommended follow up with gyn - she is going to see her friend who is a PA     Return in about 1 year (around 2025) for Annual physical.    History of Present Illness     Adult Annual Physical:  Patient presents for annual physical. She is here today to establish for primary care.  She used to go to [facility] and has aged out.  It has been since high school since she saw a rheumatologist.  She was supposed to be going there every 6 months.    Her good friend is going to be starting as a PA for  [employer].  She is planning going there in the fall for her Pap smear.     Diet and Physical Activity:  - Diet/Nutrition: well balanced diet.  - Exercise:. runs 40 miles a week    Depression Screening:  - PHQ-2 Score: 0    General Health:  - Sleep: sleeps well.  - Hearing: decreased hearing bilateral ears. worse on right  - Vision: wears glasses.  - Dental: no dental visits for > 1 year.    Review of Systems    Vision  "Screening    Right eye Left eye Both eyes   Without correction 20/40 20/50 20/40   With correction      Comments: She does not have her eye glasses with her       Objective     /62   Pulse 76   Temp 98.3 °F (36.8 °C)   Resp 16   Ht 5' 6\" (1.676 m)   Wt 55.8 kg (123 lb)   LMP  (Within Weeks) Comment: 2 weeks ago  BMI 19.85 kg/m²     Physical Exam  Vitals and nursing note reviewed.   Constitutional:       Appearance: She is well-developed.   HENT:      Head: Normocephalic and atraumatic.      Right Ear: External ear normal.      Left Ear: External ear normal.      Nose: Nose normal.   Cardiovascular:      Rate and Rhythm: Normal rate and regular rhythm.      Heart sounds: Normal heart sounds. No murmur heard.     No friction rub.   Pulmonary:      Effort: No respiratory distress.      Breath sounds: Normal breath sounds. No wheezing or rales.   Abdominal:      Palpations: Abdomen is soft.      Tenderness: There is no abdominal tenderness.   Musculoskeletal:      Right lower leg: No edema.      Left lower leg: No edema.   Neurological:      Mental Status: She is oriented to person, place, and time.      Cranial Nerves: No cranial nerve deficit.          "

## 2024-09-30 ENCOUNTER — TELEPHONE (OUTPATIENT)
Dept: FAMILY MEDICINE CLINIC | Facility: CLINIC | Age: 24
End: 2024-09-30

## 2024-09-30 NOTE — TELEPHONE ENCOUNTER
"Patient dropped off \"physical exam\" form for Dr. Gibson to complete. Last appt was 9/26/24 (new patient). Placed form in Dr. Gibson's folder. Please call patient's cell when form is ready for   "

## 2024-11-08 NOTE — PROGRESS NOTES
Ambulatory Visit  Name: Tamera Sanders      : 2000      MRN: [id number]  Encounter Provider: Trena Ochoa DO  Encounter Date: 2024   Encounter department: [facility] [place]    Assessment & Plan  Raynaud's disease without gangrene  Patient is a 24-year-old female presenting for further investigation of Raynaud's phenomenon.  Patient currently being treated with conservative measures.  Denies fissures, ulcers, pits.  Normal nailfold capillaroscopy on exam.  Patient reports that in the past she had a positive RENITA and was being monitored closely as a child.  Patient denies any other signs or symptoms of a connective tissue disease such as mucocutaneous manifestations, photosensitive rash, sicca symptoms, inflammatory joint pain, history of VTE.  No cytopenias.  Will obtain monitoring labs and check RENITA today.  -Discussed conservative measures for Raynaud's phenomenon including keeping core body temperature warm  -Complete serologic workup  Orders:    RNEITA Screen w/ Reflex to Titer/Pattern; Future    CBC and differential; Future    Comprehensive metabolic panel; Future    Protein / creatinine ratio, urine; Future    Urinalysis with microscopic; Future    Logan-Danlos syndrome  Patient was diagnosed many years ago.  Symptoms are stable  Orders:    Ambulatory referral to Rheumatology    RTC in 1 year, sooner if needed     History of Present Illness     Tamera Sanders is a 24 y.o. female with a history of asthma, Celiac disease, EDS, ADHD who presents for further evaluation of EDS.    History obtained from : patient    Patient states that at age 12 she developed Raynaud's phenomenon.  Reports that she always treated it with conservative measures such as keeping herself warm.  Patient states that she was seeing a rheumatologist as a child due to a positive RENITA test.  Reports that as far she remembers it was 1: 160 in a speckled pattern.  Patient states that she was checking periodically  to make sure nothing has progressed.  Patient states that she is then stopped following with rheumatology at the age of 20.    Patient states that she continues to have Raynaud's with no fissures, ulcers, pits.  Patient reports that she does not have any other new symptoms.    Patient states that she also was diagnosed with EDS.  Reports no pain associated with it currently.    Family history: grandfather with serolemma     Review of Systems  Complete ROS conducted as per HPI. In addition, denies:  Fever  Photosensitive rash  Sicca symptoms  Recurrent oral ulcers  Dysphagia  Muscle weakness  Uveitis  Dactylitis  Chest pain  SOB  Pleurisy  Gross hematuria  Foamy urine  Raynaud's  Morning joint stiffness  Joint swelling    Past Medical History:   Diagnosis Date    ADHD     Asthma     Attention deficit hyperactivity disorder (ADHD), combined type 9/26/2024    Pernio     Raynaud disease        Current Outpatient Medications on File Prior to Visit   Medication Sig Dispense Refill    albuterol (PROVENTIL HFA,VENTOLIN HFA) 90 mcg/act inhaler Inhale 2 puffs every 6 (six) hours as needed for wheezing      budesonide-formoterol (SYMBICORT) 160-4.5 mcg/act inhaler Inhale 2 puffs 2 (two) times a day Rinse mouth after use. (Patient not taking: Reported on 10/6/2023)      EPINEPHrine (EPIPEN) 0.3 mg/0.3 mL SOAJ PRN       No current facility-administered medications on file prior to visit.      Social History     Tobacco Use    Smoking status: Never     Passive exposure: Never    Smokeless tobacco: Never   Vaping Use    Vaping status: Never Used   Substance and Sexual Activity    Alcohol use: No    Drug use: No    Sexual activity: Not Currently     Partners: Male     Birth control/protection: Condom Male         Objective     There were no vitals taken for this visit.    Physical Exam  General appearance: normal appearing, no acute distress  Skin: normal, no rashes, normal nailfold capillaroscopy  HEENT: normal, moist oropharynx,  no nasal or oral ulcers  Lymph nodes: no palpable adenopathy  Lungs: normal respiratory effort, comfortable on room air, lungs clear to auscultation b/l   Heart: normal heart sounds, normal rate, normal rhythm,  Abdomen: soft, normal bowel sounds, no tenderness  Neurologic: no obvious neurological deficits   Extremities: no edema, warm and well perfused     Musculoskeletal Exam:   - Observation: no obvious joint abnormalities    - Palpation: no joint tenderness  - Synovitis: absent  - Joint effusions: absent  - ROM: intact throughout  - Muscle Strength: 5/5 throughout         I have personally reviewed notes, labs, and imaging available in the chart.    07/24/20 23:31   WBC 6.77   RBC 4.25   Hemoglobin 12.9   Hematocrit 36.8   MCV 87   MCH 30.4   MCHC 35.1   RDW 11.5 (L)   Platelet Count 240   MPV 10.2       Trena Ochoa DO, CCD, [facility]  [facility]

## 2024-11-11 ENCOUNTER — OFFICE VISIT (OUTPATIENT)
Dept: RHEUMATOLOGY | Facility: CLINIC | Age: 24
End: 2024-11-11
Payer: COMMERCIAL

## 2024-11-11 VITALS
WEIGHT: 124 LBS | DIASTOLIC BLOOD PRESSURE: 72 MMHG | HEIGHT: 66 IN | BODY MASS INDEX: 19.93 KG/M2 | HEART RATE: 60 BPM | OXYGEN SATURATION: 98 % | SYSTOLIC BLOOD PRESSURE: 110 MMHG

## 2024-11-11 DIAGNOSIS — Q79.60 EHLERS-DANLOS SYNDROME: ICD-10-CM

## 2024-11-11 DIAGNOSIS — I73.00 RAYNAUD'S DISEASE WITHOUT GANGRENE: Primary | ICD-10-CM

## 2024-11-11 PROCEDURE — 99204 OFFICE O/P NEW MOD 45 MIN: CPT | Performed by: STUDENT IN AN ORGANIZED HEALTH CARE EDUCATION/TRAINING PROGRAM

## 2024-11-11 NOTE — ASSESSMENT & PLAN NOTE
Patient was diagnosed many years ago.  Symptoms are stable  Orders:    Ambulatory referral to Rheumatology    RTC in 1 year, sooner if needed

## 2024-11-13 ENCOUNTER — APPOINTMENT (OUTPATIENT)
Dept: LAB | Facility: HOSPITAL | Age: 24
End: 2024-11-13
Attending: STUDENT IN AN ORGANIZED HEALTH CARE EDUCATION/TRAINING PROGRAM
Payer: COMMERCIAL

## 2024-11-13 DIAGNOSIS — I73.00 RAYNAUD'S DISEASE WITHOUT GANGRENE: ICD-10-CM

## 2024-11-13 LAB
ALBUMIN SERPL BCG-MCNC: 4.3 G/DL (ref 3.5–5)
ALP SERPL-CCNC: 63 U/L (ref 34–104)
ALT SERPL W P-5'-P-CCNC: 14 U/L (ref 7–52)
ANA SER QL IA: POSITIVE
ANION GAP SERPL CALCULATED.3IONS-SCNC: 4 MMOL/L (ref 4–13)
AST SERPL W P-5'-P-CCNC: 20 U/L (ref 13–39)
BACTERIA UR QL AUTO: ABNORMAL /HPF
BASOPHILS # BLD AUTO: 0.04 THOUSANDS/ÂΜL (ref 0–0.1)
BASOPHILS NFR BLD AUTO: 1 % (ref 0–1)
BILIRUB SERPL-MCNC: 0.4 MG/DL (ref 0.2–1)
BILIRUB UR QL STRIP: NEGATIVE
BUN SERPL-MCNC: 13 MG/DL (ref 5–25)
CALCIUM SERPL-MCNC: 9.4 MG/DL (ref 8.4–10.2)
CHLORIDE SERPL-SCNC: 104 MMOL/L (ref 96–108)
CLARITY UR: CLEAR
CO2 SERPL-SCNC: 29 MMOL/L (ref 21–32)
COLOR UR: YELLOW
CREAT SERPL-MCNC: 0.71 MG/DL (ref 0.6–1.3)
CREAT UR-MCNC: 189.5 MG/DL
EOSINOPHIL # BLD AUTO: 0.23 THOUSAND/ÂΜL (ref 0–0.61)
EOSINOPHIL NFR BLD AUTO: 5 % (ref 0–6)
ERYTHROCYTE [DISTWIDTH] IN BLOOD BY AUTOMATED COUNT: 13.6 % (ref 11.6–15.1)
GFR SERPL CREATININE-BSD FRML MDRD: 121 ML/MIN/1.73SQ M
GLUCOSE P FAST SERPL-MCNC: 90 MG/DL (ref 65–99)
GLUCOSE UR STRIP-MCNC: NEGATIVE MG/DL
HCT VFR BLD AUTO: 36.2 % (ref 34.8–46.1)
HGB BLD-MCNC: 11.3 G/DL (ref 11.5–15.4)
HGB UR QL STRIP.AUTO: NEGATIVE
IMM GRANULOCYTES # BLD AUTO: 0 THOUSAND/UL (ref 0–0.2)
IMM GRANULOCYTES NFR BLD AUTO: 0 % (ref 0–2)
KETONES UR STRIP-MCNC: NEGATIVE MG/DL
LEUKOCYTE ESTERASE UR QL STRIP: NEGATIVE
LYMPHOCYTES # BLD AUTO: 1.23 THOUSANDS/ÂΜL (ref 0.6–4.47)
LYMPHOCYTES NFR BLD AUTO: 24 % (ref 14–44)
MCH RBC QN AUTO: 27.3 PG (ref 26.8–34.3)
MCHC RBC AUTO-ENTMCNC: 31.2 G/DL (ref 31.4–37.4)
MCV RBC AUTO: 87 FL (ref 82–98)
MONOCYTES # BLD AUTO: 0.62 THOUSAND/ÂΜL (ref 0.17–1.22)
MONOCYTES NFR BLD AUTO: 12 % (ref 4–12)
MUCOUS THREADS UR QL AUTO: ABNORMAL
NEUTROPHILS # BLD AUTO: 3.03 THOUSANDS/ÂΜL (ref 1.85–7.62)
NEUTS SEG NFR BLD AUTO: 58 % (ref 43–75)
NITRITE UR QL STRIP: NEGATIVE
NON-SQ EPI CELLS URNS QL MICRO: ABNORMAL /HPF
NRBC BLD AUTO-RTO: 0 /100 WBCS
PH UR STRIP.AUTO: 5.5 [PH]
PLATELET # BLD AUTO: 287 THOUSANDS/UL (ref 149–390)
PMV BLD AUTO: 10.5 FL (ref 8.9–12.7)
POTASSIUM SERPL-SCNC: 4.6 MMOL/L (ref 3.5–5.3)
PROT SERPL-MCNC: 7.2 G/DL (ref 6.4–8.4)
PROT UR STRIP-MCNC: NEGATIVE MG/DL
PROT UR-MCNC: 8.4 MG/DL
PROT/CREAT UR: 0 MG/G{CREAT} (ref 0–0.1)
RBC # BLD AUTO: 4.14 MILLION/UL (ref 3.81–5.12)
RBC #/AREA URNS AUTO: ABNORMAL /HPF
SODIUM SERPL-SCNC: 137 MMOL/L (ref 135–147)
SP GR UR STRIP.AUTO: >=1.03 (ref 1–1.03)
UROBILINOGEN UR STRIP-ACNC: <2 MG/DL
WBC # BLD AUTO: 5.15 THOUSAND/UL (ref 4.31–10.16)
WBC #/AREA URNS AUTO: ABNORMAL /HPF

## 2024-11-13 PROCEDURE — 82570 ASSAY OF URINE CREATININE: CPT

## 2024-11-13 PROCEDURE — 86225 DNA ANTIBODY NATIVE: CPT

## 2024-11-13 PROCEDURE — 85025 COMPLETE CBC W/AUTO DIFF WBC: CPT

## 2024-11-13 PROCEDURE — 86039 ANTINUCLEAR ANTIBODIES (ANA): CPT

## 2024-11-13 PROCEDURE — 84156 ASSAY OF PROTEIN URINE: CPT

## 2024-11-13 PROCEDURE — 36415 COLL VENOUS BLD VENIPUNCTURE: CPT

## 2024-11-13 PROCEDURE — 86038 ANTINUCLEAR ANTIBODIES: CPT

## 2024-11-13 PROCEDURE — 81001 URINALYSIS AUTO W/SCOPE: CPT

## 2024-11-13 PROCEDURE — 86235 NUCLEAR ANTIGEN ANTIBODY: CPT

## 2024-11-13 PROCEDURE — 80053 COMPREHEN METABOLIC PANEL: CPT

## 2024-11-15 ENCOUNTER — RESULTS FOLLOW-UP (OUTPATIENT)
Age: 24
End: 2024-11-15

## 2024-11-15 LAB
ANA HOMOGEN SER QL IF: NORMAL
ANA HOMOGEN TITR SER: NORMAL {TITER}
DSDNA AB SER-ACNC: <4 IU/ML (ref ?–5)
ENA RNP AB SER IA-ACNC: NEGATIVE
ENA SM AB SER IA-ACNC: NEGATIVE
ENA SM+RNP IGG SER-ACNC: NEGATIVE
ENA SS-A AB SER IA-ACNC: NEGATIVE
ENA SS-B AB SER IA-ACNC: NEGATIVE

## 2024-11-26 ENCOUNTER — OFFICE VISIT (OUTPATIENT)
Dept: CARDIOLOGY CLINIC | Facility: CLINIC | Age: 24
End: 2024-11-26
Payer: COMMERCIAL

## 2024-11-26 VITALS
SYSTOLIC BLOOD PRESSURE: 100 MMHG | DIASTOLIC BLOOD PRESSURE: 70 MMHG | HEART RATE: 59 BPM | WEIGHT: 123 LBS | BODY MASS INDEX: 19.77 KG/M2 | HEIGHT: 66 IN | OXYGEN SATURATION: 98 %

## 2024-11-26 DIAGNOSIS — Z82.49 FAMILY HISTORY OF DILATED CARDIOMYOPATHY: Primary | ICD-10-CM

## 2024-11-26 DIAGNOSIS — Z86.79 HISTORY OF PSVT (PAROXYSMAL SUPRAVENTRICULAR TACHYCARDIA): ICD-10-CM

## 2024-11-26 DIAGNOSIS — R00.2 INTERMITTENT PALPITATIONS: ICD-10-CM

## 2024-11-26 PROCEDURE — 99214 OFFICE O/P EST MOD 30 MIN: CPT | Performed by: INTERNAL MEDICINE

## 2024-11-26 PROCEDURE — 93000 ELECTROCARDIOGRAM COMPLETE: CPT | Performed by: INTERNAL MEDICINE

## 2024-11-26 NOTE — PROGRESS NOTES
Subjective:     Tamera Sanders is a 24 y.o. female  who presents to the office today for evaluation of potential familial cardiomyopathy.  Her father recently was diagnosed with cardiomyopathy.  She is unsure of the cause of it but she was told by her father that the doctor recommended evaluation of his family members.  She has no other family members with cardiac disease and no family members which have  at an early age.     The patient was previously following with Dr. Gomez and was last seen 2 years ago.  She has a history of postural near syncope and palpitations.  She has had syncopal events in the past.  She has a history of Logan-Danlos syndrome and POTS.  In 2022 she had a tilt table test which was abnormal.  She had a syncopal event after developing tachycardia with hypotension 25 minutes into test.  She had an extended Holter monitor which was overall normal.    She denies any chest pain or shortness of breath.  No syncope or near syncope.    The following portions of the patient's history were reviewed and updated as appropriate: allergies, current medications, past family history, past medical history, past social history, past surgical history, and problem list.    Review of Systems  Review of Systems   Respiratory:  Negative for shortness of breath.    Cardiovascular:  Negative for chest pain, palpitations and leg swelling.   Neurological:  Negative for syncope.   All other systems reviewed and are negative.       Current Outpatient Medications on File Prior to Visit   Medication Sig Dispense Refill    albuterol (PROVENTIL HFA,VENTOLIN HFA) 90 mcg/act inhaler Inhale 2 puffs every 6 (six) hours as needed for wheezing      EPINEPHrine (EPIPEN) 0.3 mg/0.3 mL SOAJ PRN      budesonide-formoterol (SYMBICORT) 160-4.5 mcg/act inhaler Inhale 2 puffs 2 (two) times a day Rinse mouth after use. (Patient not taking: Reported on 2024)       No current facility-administered medications on file prior  "to visit.          Objective:      Physical Exam  /70 (BP Location: Right arm, Patient Position: Sitting, Cuff Size: Standard)   Pulse 59   Ht 5' 6\" (1.676 m)   Wt 55.8 kg (123 lb)   SpO2 98%   BMI 19.85 kg/m²    Physical Exam   Constitutional: She appears healthy. No distress.   Eyes: Pupils are equal, round, and reactive to light. Conjunctivae are normal.   Neck: No JVD present.   Cardiovascular: Normal rate, regular rhythm and normal heart sounds. Exam reveals no gallop and no friction rub.   No murmur heard.  Pulmonary/Chest: Effort normal and breath sounds normal. She has no wheezes. She has no rales.   Musculoskeletal:         General: No tenderness, deformity or edema.      Cervical back: Normal range of motion and neck supple.   Neurological: She is alert and oriented to person, place, and time.   Skin: Skin is warm and dry.        Cardiographics    See above    ECG: normal sinus rhythm, no blocks or conduction defects, no ischemic changes    Lab Review   Lab Results   Component Value Date    K 4.6 11/13/2024     11/13/2024    CO2 29 11/13/2024    BUN 13 11/13/2024    CREATININE 0.71 11/13/2024    CALCIUM 9.4 11/13/2024     Lab Results   Component Value Date    WBC 5.15 11/13/2024    HGB 11.3 (L) 11/13/2024    HCT 36.2 11/13/2024    MCV 87 11/13/2024     11/13/2024       No results found for: \"CHOL\", \"TRIG\", \"HDL\", \"LDLCALC\"   Assessment/Plan:     Assessment & Plan  Family history of dilated cardiomyopathy  -Patient's father was recently diagnosed with cardiomyopathy but she is unsure of etiology.  Will schedule for 2D echocardiogram for further evaluation.  Discussed symptoms of cardiomyopathy with patient if  echocardiogram is normal, she should continue to monitor for any symptoms of congestive heart failure as it may develop later in life.  -In the absence of additional family members with cardiomyopathy, recommend against genetic testing at this time.  History of PSVT (paroxysmal " supraventricular tachycardia)  -No recent episodes

## 2024-11-27 LAB
APOB+LDLR+PCSK9 GENE MUT ANL BLD/T: NOT DETECTED
BRCA1+BRCA2 DEL+DUP + FULL MUT ANL BLD/T: NOT DETECTED
MLH1+MSH2+MSH6+PMS2 GN DEL+DUP+FUL M: NOT DETECTED

## 2024-12-06 ENCOUNTER — HOSPITAL ENCOUNTER (OUTPATIENT)
Dept: NON INVASIVE DIAGNOSTICS | Facility: HOSPITAL | Age: 24
Discharge: HOME | End: 2024-12-06
Attending: INTERNAL MEDICINE

## 2024-12-06 DIAGNOSIS — Z82.49 FAMILY HISTORY OF DILATED CARDIOMYOPATHY: ICD-10-CM

## 2025-01-08 ENCOUNTER — OFFICE VISIT (OUTPATIENT)
Dept: FAMILY MEDICINE CLINIC | Facility: CLINIC | Age: 25
End: 2025-01-08
Payer: COMMERCIAL

## 2025-01-08 VITALS
RESPIRATION RATE: 16 BRPM | SYSTOLIC BLOOD PRESSURE: 118 MMHG | BODY MASS INDEX: 20.12 KG/M2 | TEMPERATURE: 96.5 F | WEIGHT: 125.2 LBS | HEIGHT: 66 IN | HEART RATE: 64 BPM | DIASTOLIC BLOOD PRESSURE: 66 MMHG

## 2025-01-08 DIAGNOSIS — M35.9 AUTOIMMUNE CONNECTIVE TISSUE DISORDER (HCC): ICD-10-CM

## 2025-01-08 DIAGNOSIS — B96.89 ACUTE BACTERIAL SINUSITIS: Primary | ICD-10-CM

## 2025-01-08 DIAGNOSIS — Q79.60 EHLERS-DANLOS SYNDROME: ICD-10-CM

## 2025-01-08 DIAGNOSIS — J01.90 ACUTE BACTERIAL SINUSITIS: Primary | ICD-10-CM

## 2025-01-08 DIAGNOSIS — J45.20 MILD INTERMITTENT ASTHMA WITHOUT COMPLICATION: ICD-10-CM

## 2025-01-08 PROCEDURE — 99213 OFFICE O/P EST LOW 20 MIN: CPT | Performed by: FAMILY MEDICINE

## 2025-01-08 RX ORDER — METHYLPREDNISOLONE 4 MG/1
TABLET ORAL
Qty: 21 EACH | Refills: 0 | Status: SHIPPED | OUTPATIENT
Start: 2025-01-08

## 2025-01-08 NOTE — PROGRESS NOTES
"Name: Tamera Sanders      : 2000      MRN: [id number]  Encounter Provider: Frank Lombardi, DO  Encounter Date: 2025   Encounter department: [facility]  :  Assessment & Plan  Acute bacterial sinusitis  Advised on dayqui/nyquil  Steroids  Pt hasa  flight she will use afrin before hand  Orders:  •  amoxicillin-clavulanate (AUGMENTIN) 875-125 mg per tablet; Take 1 tablet by mouth every 12 (twelve) hours for 7 days  •  methylPREDNISolone 4 MG tablet therapy pack; Use as directed on package    Autoimmune connective tissue disorder (HCC)  No acute issues       Logan-Danlos syndrome  No acute issues rel;ated to infection       Mild intermittent asthma without complication  No wheezing today              History of Present Illness     Pt with sinus pressure  Teeth hurt  Throat hearts head hurts  Been sick for a week  [place] on saturday      Review of Systems   HENT:  Positive for congestion, sinus pressure and sinus pain.    Respiratory:  Positive for cough.        Objective   /66   Pulse 64   Temp (!) 96.5 °F (35.8 °C)   Resp 16   Ht 5' 6\" (1.676 m)   Wt 56.8 kg (125 lb 3.2 oz)   BMI 20.21 kg/m²      Physical Exam  HENT:      Nose: Congestion and rhinorrhea present.         "

## 2025-01-22 ENCOUNTER — TELEPHONE (OUTPATIENT)
Age: 25
End: 2025-01-22

## 2025-01-22 ENCOUNTER — TELEPHONE (OUTPATIENT)
Dept: FAMILY MEDICINE CLINIC | Facility: CLINIC | Age: 25
End: 2025-01-22

## 2025-01-22 DIAGNOSIS — Z91.018 NUT ALLERGY: ICD-10-CM

## 2025-01-22 DIAGNOSIS — Z91.018 NUT ALLERGY: Primary | ICD-10-CM

## 2025-01-22 RX ORDER — EPINEPHRINE 0.3 MG/.3ML
0.3 INJECTION SUBCUTANEOUS DAILY PRN
Qty: 2 EACH | Refills: 1 | Status: SHIPPED | OUTPATIENT
Start: 2025-01-22

## 2025-01-22 RX ORDER — EPINEPHRINE 0.3 MG/.3ML
INJECTION SUBCUTANEOUS
Refills: 0 | OUTPATIENT
Start: 2025-01-22

## 2025-01-22 NOTE — TELEPHONE ENCOUNTER
Patient called the RX Refill Line. Message is being forwarded to the office.     Patient called requesting refill of her Epi Pen. Patient requesting to have nasal form of epinephrine. Patient states would like to have both injection and nasal form, but if she can only have one, she would prefer the nasal form. Patient requesting new script sent to [facility] pharmacy. Patient would like phone call to discuss medication.

## 2025-01-27 ENCOUNTER — OFFICE VISIT (OUTPATIENT)
Dept: FAMILY MEDICINE CLINIC | Facility: CLINIC | Age: 25
End: 2025-01-27
Payer: COMMERCIAL

## 2025-01-27 VITALS
SYSTOLIC BLOOD PRESSURE: 90 MMHG | RESPIRATION RATE: 16 BRPM | HEART RATE: 80 BPM | WEIGHT: 118 LBS | DIASTOLIC BLOOD PRESSURE: 60 MMHG | BODY MASS INDEX: 18.96 KG/M2 | TEMPERATURE: 97 F | HEIGHT: 66 IN

## 2025-01-27 DIAGNOSIS — J01.00 ACUTE NON-RECURRENT MAXILLARY SINUSITIS: Primary | ICD-10-CM

## 2025-01-27 PROCEDURE — 99213 OFFICE O/P EST LOW 20 MIN: CPT | Performed by: NURSE PRACTITIONER

## 2025-01-27 RX ORDER — CEFDINIR 300 MG/1
300 CAPSULE ORAL EVERY 12 HOURS SCHEDULED
Qty: 14 CAPSULE | Refills: 0 | Status: SHIPPED | OUTPATIENT
Start: 2025-01-27 | End: 2025-02-03

## 2025-01-27 NOTE — PROGRESS NOTES
"Name: Tamera Sanders      : 2000      MRN: [id number]  Encounter Provider: JULIOCESAR Vega  Encounter Date: 2025   Encounter department: [facility]  :  Assessment & Plan  Acute non-recurrent maxillary sinusitis  Will cover with cefdinir.  Recommended she continue with Flonase and also use saline sinus rinses.  Mucinex D for symptom relief.  Follow-up as needed  Orders:  •  cefdinir (OMNICEF) 300 mg capsule; Take 1 capsule (300 mg total) by mouth every 12 (twelve) hours for 7 days           History of Present Illness   She has been sick for the past 3 weeks with cold symptoms.  She has increasing sinus pressure, pain, chills, and fatigue.  She is using Flonase daily and taking Sudafed as needed      Review of Systems   Constitutional:  Positive for chills and fatigue. Negative for fever.   HENT:  Positive for congestion, postnasal drip, sinus pressure and sinus pain. Negative for ear pain, rhinorrhea and sore throat.    Respiratory:  Negative for cough, shortness of breath and wheezing.    Cardiovascular:  Negative for chest pain.   Gastrointestinal:  Negative for abdominal pain, diarrhea, nausea and vomiting.   Musculoskeletal:  Negative for arthralgias.   Skin:  Negative for rash.   Neurological:  Positive for headaches.       Objective   BP 90/60   Pulse 80   Temp (!) 97 °F (36.1 °C)   Resp 16   Ht 5' 6\" (1.676 m)   Wt 53.5 kg (118 lb)   LMP 2025 (Approximate)   BMI 19.05 kg/m²      Physical Exam  Vitals and nursing note reviewed.   Constitutional:       General: She is not in acute distress.     Appearance: Normal appearance.   HENT:      Head: Normocephalic and atraumatic.      Right Ear: Tympanic membrane normal.      Left Ear: Tympanic membrane normal.      Nose: Congestion present.      Right Turbinates: Swollen.      Left Turbinates: Swollen.      Right Sinus: Maxillary sinus tenderness present.      Left Sinus: Maxillary sinus tenderness present.      " Mouth/Throat:      Pharynx: Oropharynx is clear.   Eyes:      Conjunctiva/sclera: Conjunctivae normal.   Neck:      Vascular: No carotid bruit.   Cardiovascular:      Rate and Rhythm: Normal rate and regular rhythm.      Pulses: Normal pulses.      Heart sounds: Normal heart sounds. No murmur heard.  Pulmonary:      Effort: Pulmonary effort is normal.      Breath sounds: Normal breath sounds.   Lymphadenopathy:      Cervical: No cervical adenopathy.   Skin:     General: Skin is warm and dry.   Neurological:      Mental Status: She is alert.   Psychiatric:         Mood and Affect: Mood normal.         Behavior: Behavior normal.

## 2025-01-27 NOTE — LETTER
January 27, 2025     Patient: Tamera Sanders  YOB: 2000  Date of Visit: 1/27/2025      To Whom it May Concern:    Tamera Sanders is under my professional care. Tamera was seen in my office on 1/27/2025. Please excuse from school 1/26/25 and 1/27/25.    If you have any questions or concerns, please don't hesitate to call.         Sincerely,          JULIOCESAR Vega        CC: No Recipients

## 2025-03-07 ENCOUNTER — RESULTS FOLLOW-UP (OUTPATIENT)
Dept: CARDIOLOGY CLINIC | Facility: CLINIC | Age: 25
End: 2025-03-07

## 2025-03-07 ENCOUNTER — HOSPITAL ENCOUNTER (OUTPATIENT)
Dept: NON INVASIVE DIAGNOSTICS | Facility: HOSPITAL | Age: 25
Discharge: HOME | End: 2025-03-07
Payer: COMMERCIAL

## 2025-03-07 VITALS
HEART RATE: 55 BPM | BODY MASS INDEX: 18.96 KG/M2 | WEIGHT: 118 LBS | SYSTOLIC BLOOD PRESSURE: 90 MMHG | DIASTOLIC BLOOD PRESSURE: 60 MMHG | HEIGHT: 66 IN

## 2025-03-07 LAB
AORTIC ROOT: 2.6 CM
AORTIC VALVE MEAN VELOCITY: 9.4 M/S
ASCENDING AORTA: 2.7 CM
AV LVOT MEAN GRADIENT: 2 MMHG
AV LVOT PEAK GRADIENT: 4 MMHG
AV MEAN PRESS GRAD SYS DOP V1V2: 4 MMHG
AV PEAK GRADIENT: 9 MMHG
AV VELOCITY RATIO: 0.74
AV VMAX SYS DOP: 1.47 M/S
BSA FOR ECHO PROCEDURE: 1.6 M2
DOP CALC AO VTI: 28.72 CM
DOP CALC LVOT PEAK VEL VTI: 21.3 CM
DOP CALC LVOT PEAK VEL: 0.96 M/S
E WAVE DECELERATION TIME: 333 MS
E/A RATIO: 1.68
FRACTIONAL SHORTENING: 30 (ref 28–44)
GLOBAL LONGITUIDAL STRAIN: -21 %
INTERVENTRICULAR SEPTUM IN DIASTOLE (PARASTERNAL SHORT AXIS VIEW): 0.7 CM
INTERVENTRICULAR SEPTUM: 0.7 CM (ref 0.6–1.1)
LAAS-AP2: 15.7 CM2
LAAS-AP4: 15.6 CM2
LEFT ATRIUM SIZE: 3.3 CM
LEFT ATRIUM VOLUME (MOD BIPLANE): 41 ML
LEFT ATRIUM VOLUME INDEX (MOD BIPLANE): 25.6 ML/M2
LEFT INTERNAL DIMENSION IN SYSTOLE: 3.3 CM (ref 2.1–4)
LEFT VENTRICULAR INTERNAL DIMENSION IN DIASTOLE: 4.7 CM (ref 3.5–6)
LEFT VENTRICULAR POSTERIOR WALL IN END DIASTOLE: 0.9 CM
LEFT VENTRICULAR STROKE VOLUME: 58 ML
LV EF US.2D.A4C+ESTIMATED: 55 %
LVSV (TEICH): 58 ML
MV E'TISSUE VEL-LAT: 23 CM/S
MV E'TISSUE VEL-SEP: 15 CM/S
MV PEAK A VEL: 0.41 M/S
MV PEAK E VEL: 69 CM/S
MV STENOSIS PRESSURE HALF TIME: 97 MS
MV VALVE AREA P 1/2 METHOD: 2.27
RIGHT ATRIUM AREA SYSTOLE A4C: 16 CM2
RIGHT VENTRICLE ID DIMENSION: 3.9 CM
SL CV LEFT ATRIUM LENGTH A2C: 4.5 CM
SL CV LV EF: 60
SL CV PED ECHO LEFT VENTRICLE DIASTOLIC VOLUME (MOD BIPLANE) 2D: 101 ML
SL CV PED ECHO LEFT VENTRICLE SYSTOLIC VOLUME (MOD BIPLANE) 2D: 44 ML
TRICUSPID ANNULAR PLANE SYSTOLIC EXCURSION: 2.1 CM

## 2025-03-07 PROCEDURE — 93306 TTE W/DOPPLER COMPLETE: CPT

## 2025-03-07 PROCEDURE — 93306 TTE W/DOPPLER COMPLETE: CPT | Performed by: INTERNAL MEDICINE

## 2025-03-07 PROCEDURE — 93356 MYOCRD STRAIN IMG SPCKL TRCK: CPT | Performed by: INTERNAL MEDICINE

## 2025-03-07 PROCEDURE — 93356 MYOCRD STRAIN IMG SPCKL TRCK: CPT

## 2025-03-26 ENCOUNTER — OFFICE VISIT (OUTPATIENT)
Dept: FAMILY MEDICINE CLINIC | Facility: CLINIC | Age: 25
End: 2025-03-26
Payer: COMMERCIAL

## 2025-03-26 VITALS
HEIGHT: 66 IN | BODY MASS INDEX: 20.12 KG/M2 | WEIGHT: 125.2 LBS | TEMPERATURE: 97.6 F | SYSTOLIC BLOOD PRESSURE: 114 MMHG | RESPIRATION RATE: 16 BRPM | DIASTOLIC BLOOD PRESSURE: 68 MMHG | HEART RATE: 60 BPM

## 2025-03-26 DIAGNOSIS — J02.9 ACUTE PHARYNGITIS, UNSPECIFIED ETIOLOGY: Primary | ICD-10-CM

## 2025-03-26 LAB — S PYO AG THROAT QL: NEGATIVE

## 2025-03-26 PROCEDURE — 99213 OFFICE O/P EST LOW 20 MIN: CPT | Performed by: NURSE PRACTITIONER

## 2025-03-26 PROCEDURE — 87880 STREP A ASSAY W/OPTIC: CPT | Performed by: NURSE PRACTITIONER

## 2025-03-26 NOTE — PROGRESS NOTES
"Name: Tamera Sanders      : 2000      MRN: [id number]  Encounter Provider: JULIOCESAR Vega  Encounter Date: 3/26/2025   Encounter department: [facility]  :  Assessment & Plan  Acute pharyngitis, unspecified etiology  Rapid strep negative, reviewed symptomatic treatment.  Recommend daughter was contacted, throat lozenges, and vitamin C/zinc.  F/u as needed.  Note given for school.  Orders:  •  POCT rapid ANTIGEN strepA      Recent Results (from the past 24 hours)   POCT rapid ANTIGEN strepA    Collection Time: 25  4:13 PM   Result Value Ref Range     RAPID STREP A Negative Negative            History of Present Illness   HPI  Review of Systems   Constitutional:  Positive for fatigue. Negative for chills and fever.   HENT:  Positive for sore throat. Negative for congestion, ear pain, postnasal drip, rhinorrhea and sinus pressure.    Respiratory:  Negative for cough, shortness of breath and wheezing.    Cardiovascular:  Negative for chest pain.   Gastrointestinal:  Negative for abdominal pain, diarrhea, nausea and vomiting.   Musculoskeletal:  Negative for arthralgias.   Skin:  Negative for rash.   Neurological:  Negative for headaches.       Objective   /68   Pulse 60   Temp 97.6 °F (36.4 °C)   Resp 16   Ht 5' 6\" (1.676 m)   Wt 56.8 kg (125 lb 3.2 oz)   LMP 2025 (Exact Date)   BMI 20.21 kg/m²      Physical Exam  Vitals and nursing note reviewed.   Constitutional:       General: She is not in acute distress.     Appearance: Normal appearance.   HENT:      Head: Normocephalic and atraumatic.      Right Ear: Tympanic membrane normal.      Left Ear: Tympanic membrane normal.      Nose: Nose normal.      Mouth/Throat:      Pharynx: Posterior oropharyngeal erythema present. No oropharyngeal exudate.   Eyes:      Conjunctiva/sclera: Conjunctivae normal.   Neck:      Vascular: No carotid bruit.   Cardiovascular:      Rate and Rhythm: Normal rate and regular rhythm.      " Pulses: Normal pulses.      Heart sounds: Normal heart sounds. No murmur heard.  Pulmonary:      Effort: Pulmonary effort is normal.      Breath sounds: Normal breath sounds.   Lymphadenopathy:      Cervical: No cervical adenopathy.   Skin:     General: Skin is warm and dry.   Neurological:      Mental Status: She is alert.   Psychiatric:         Mood and Affect: Mood normal.         Behavior: Behavior normal.

## 2025-03-26 NOTE — LETTER
March 26, 2025     Patient: Tamera Sanders  YOB: 2000  Date of Visit: 3/26/2025      To Whom it May Concern:    Tamera Sanders is under my professional care. Tamera was seen in my office on 3/26/2025. Please excuse from class 3/26/25 and 3/27/25 if needed.    If you have any questions or concerns, please don't hesitate to call.         Sincerely,          JULIOCESAR Vega        CC: No Recipients

## 2025-03-30 ENCOUNTER — OFFICE VISIT (OUTPATIENT)
Dept: URGENT CARE | Facility: CLINIC | Age: 25
End: 2025-03-30
Payer: COMMERCIAL

## 2025-03-30 VITALS
HEIGHT: 67 IN | TEMPERATURE: 97.6 F | DIASTOLIC BLOOD PRESSURE: 74 MMHG | OXYGEN SATURATION: 98 % | WEIGHT: 122.6 LBS | HEART RATE: 78 BPM | RESPIRATION RATE: 16 BRPM | SYSTOLIC BLOOD PRESSURE: 118 MMHG | BODY MASS INDEX: 19.24 KG/M2

## 2025-03-30 DIAGNOSIS — J01.00 ACUTE NON-RECURRENT MAXILLARY SINUSITIS: Primary | ICD-10-CM

## 2025-03-30 PROCEDURE — 99213 OFFICE O/P EST LOW 20 MIN: CPT | Performed by: PHYSICIAN ASSISTANT

## 2025-03-30 NOTE — PROGRESS NOTES
[facility]        NAME: Tamera Sanders is a 24 y.o. female  : 2000    MRN: [id number]  DATE: 2025  TIME: 9:56 AM    Assessment and Plan   Acute non-recurrent maxillary sinusitis [J01.00]  1. Acute non-recurrent maxillary sinusitis  amoxicillin-clavulanate (AUGMENTIN) 875-125 mg per tablet            Patient Instructions   Acute maxillary sinusitis  Rx Augmentin twice daily x 7 days, sent via EMR  Viral upper respiratory infection  Recommend flonase nasal spray and sudafed for postnasal drip/cough  Warm salt water gargles  Rest, fluids and supportive care  May benefit from a cool mist humidifier on night stand  Tylenol/ibuprofen as needed for pain/fever    Follow up with PCP in 3-5 days.  Proceed to  ER if symptoms worsen.    If tests have been performed at [facility], our office will contact you with results if changes need to be made to the care plan discussed with you at the visit.  You can review your full results on [facility] MyChart.    Chief Complaint     Chief Complaint   Patient presents with    Sinusitis     PT reports symptoms started a week ago. Has sinus pressure, mucus, headache. OTC- Mucinex         History of Present Illness       Tamera is a 24-year-old female who presents to clinic complaining of bilateral maxillary sinus pain and pressure x 1 week.  She states she was seen by her PCP earlier in the week and they felt it was viral and symptoms not long enough for sinusitis.  She states no improvement in her symptoms however.  She is also complaining nasal congestion, rhinorrhea, and cough.  She describes the cough as productive and worse when she lays down at night.  She denies any fever, chills, fatigue, myalgias, sore throat, ear pain, shortness of breath, nausea, vomiting, diarrhea, loss of taste or smell, recent travel, or any known sick contacts.        Review of Systems   Review of Systems   Constitutional:  Negative for chills, fatigue and fever.   HENT:  Positive for  congestion, rhinorrhea, sinus pressure and sinus pain. Negative for ear pain and sore throat.    Respiratory:  Positive for cough. Negative for shortness of breath.    Gastrointestinal:  Negative for diarrhea, nausea and vomiting.   Musculoskeletal:  Negative for myalgias.   Neurological:  Positive for headaches. Negative for dizziness.         Current Medications       Current Outpatient Medications:     albuterol (PROVENTIL HFA,VENTOLIN HFA) 90 mcg/act inhaler, Inhale 2 puffs every 6 (six) hours as needed for wheezing, Disp: , Rfl:     amoxicillin-clavulanate (AUGMENTIN) 875-125 mg per tablet, Take 1 tablet by mouth every 12 (twelve) hours for 7 days, Disp: 14 tablet, Rfl: 0    EPINEPHrine (EPIPEN) 0.3 mg/0.3 mL SOAJ, Inject 0.3 mL (0.3 mg total) into a muscle daily as needed for anaphylaxis PRN, Disp: 2 each, Rfl: 1    EPINEPHrine 2 MG/0.1ML SOLN, 2 mg into each nostril once as needed (Anaphylaxis), Disp: 2 each, Rfl: 1    budesonide-formoterol (SYMBICORT) 160-4.5 mcg/act inhaler, Inhale 2 puffs 2 (two) times a day Rinse mouth after use. (Patient not taking: Reported on 3/30/2025), Disp: , Rfl:     Current Allergies     Allergies as of 03/30/2025 - Reviewed 03/26/2025   Allergen Reaction Noted    Other Anaphylaxis 02/11/2016    Betadine [povidone iodine]  02/26/2020    Celebrex [celecoxib] Hives 10/16/2017    Nuts - food allergy Other (See Comments) 10/16/2017    Wheat bran - food allergy Other (See Comments) 09/26/2023            The following portions of the patient's history were reviewed and updated as appropriate: allergies, current medications, past family history, past medical history, past social history, past surgical history and problem list.     Past Medical History:   Diagnosis Date    ADHD     Asthma     Attention deficit hyperactivity disorder (ADHD), combined type 9/26/2024    Pernio     Raynaud disease        Past Surgical History:   Procedure Laterality Date    HAND SURGERY      ganglion cyst     "UT ARTHROSCOPY KNEE DIAGNOSTIC W/WO SYNOVIAL BX SPX Right 05/21/2020    Procedure: ARTHROSCOPY KNEE;  Surgeon: Pawan Reaves MD;  Location: [facility];  Service: Orthopedics    UT LIGAMENTOUS RECONSTRUCTION KNEE EXTRA-ARTICULAR Right 05/21/2020    Procedure: KNEE DIAGNOSTIC ARTHROSCOPY WITH OPEN MEDIAL PATELLOFEMORAL LIGAMENT RECONSTRUCTION WITH HAMSTRING AUTOGRAFT (MPFL);  Surgeon: Pawan Reaves MD;  Location: [facility];  Service: Orthopedics    TENDON REPAIR  05/2020       Family History   Problem Relation Age of Onset    No Known Problems Mother     Coronary artery disease Father     Dilated cardiomyopathy Father     No Known Problems Sister     No Known Problems Brother     No Known Problems Maternal Grandmother     No Known Problems Maternal Grandfather     No Known Problems Paternal Grandmother     No Known Problems Paternal Grandfather     No Known Problems Maternal Aunt     No Known Problems Maternal Uncle     No Known Problems Paternal Aunt     No Known Problems Paternal Uncle          Medications have been verified.        Objective   /74   Pulse 78   Temp 97.6 °F (36.4 °C)   Resp 16   Ht 5' 7\" (1.702 m)   Wt 55.6 kg (122 lb 9.6 oz)   LMP 03/17/2025 (Exact Date)   SpO2 98%   BMI 19.20 kg/m²   Patient's last menstrual period was 03/17/2025 (exact date).       Physical Exam     Physical Exam  Vitals and nursing note reviewed.   Constitutional:       General: She is not in acute distress.     Appearance: Normal appearance. She is not ill-appearing.   HENT:      Right Ear: Tympanic membrane, ear canal and external ear normal.      Left Ear: Tympanic membrane, ear canal and external ear normal.      Nose: Congestion present.      Right Sinus: Maxillary sinus tenderness present. No frontal sinus tenderness.      Left Sinus: Maxillary sinus tenderness present. No frontal sinus tenderness.      Mouth/Throat:      Mouth: Mucous membranes are moist.      Pharynx: No oropharyngeal exudate or " posterior oropharyngeal erythema.   Cardiovascular:      Rate and Rhythm: Normal rate and regular rhythm.      Heart sounds: Normal heart sounds.   Pulmonary:      Effort: Pulmonary effort is normal.      Breath sounds: Normal breath sounds.   Lymphadenopathy:      Cervical: Cervical adenopathy present.   Neurological:      Mental Status: She is alert and oriented to person, place, and time.   Psychiatric:         Mood and Affect: Mood normal.         Behavior: Behavior normal.

## 2025-06-01 ENCOUNTER — OFFICE VISIT (OUTPATIENT)
Dept: URGENT CARE | Facility: CLINIC | Age: 25
End: 2025-06-01
Payer: COMMERCIAL

## 2025-06-01 VITALS
RESPIRATION RATE: 14 BRPM | DIASTOLIC BLOOD PRESSURE: 54 MMHG | TEMPERATURE: 98.1 F | HEART RATE: 57 BPM | HEIGHT: 67 IN | BODY MASS INDEX: 18.83 KG/M2 | SYSTOLIC BLOOD PRESSURE: 96 MMHG | WEIGHT: 120 LBS | OXYGEN SATURATION: 99 %

## 2025-06-01 DIAGNOSIS — J01.01 ACUTE RECURRENT MAXILLARY SINUSITIS: Primary | ICD-10-CM

## 2025-06-01 PROCEDURE — G0382 LEV 3 HOSP TYPE B ED VISIT: HCPCS | Performed by: NURSE PRACTITIONER

## 2025-06-01 PROCEDURE — S9083 URGENT CARE CENTER GLOBAL: HCPCS | Performed by: NURSE PRACTITIONER

## 2025-06-01 PROCEDURE — 99283 EMERGENCY DEPT VISIT LOW MDM: CPT | Performed by: NURSE PRACTITIONER

## 2025-06-01 NOTE — PATIENT INSTRUCTIONS
-- Take Rx antibiotic as prescribed.  Take with food  --Continue over-the-counter Sudafed  --Continue over-the-counter saline nasal spray or [place] pot  --Continue over-the-counter Flonase, steam, warm compresses  --Motrin as needed  --Follow-up with ENT for ongoing symptoms over the next week and/or recurrent episodes.  Go to ER if worse.

## 2025-06-01 NOTE — PROGRESS NOTES
"  [facility]        NAME: Tamera Sanders is a 25 y.o. female  : 2000    MRN: [id number]  DATE: 2025  TIME: 2:50 PM    Assessment and Plan   Acute recurrent maxillary sinusitis [J01.01]  1. Acute recurrent maxillary sinusitis  amoxicillin-clavulanate (AUGMENTIN) 875-125 mg per tablet        --Denies prior adverse effects from Augmentin.  Denies pregnancy.  --Declines Rx prednisone at present    Patient Instructions     Patient Instructions   -- Take Rx antibiotic as prescribed.  Take with food  --Continue over-the-counter Sudafed  --Continue over-the-counter saline nasal spray or [place] pot  --Continue over-the-counter Flonase, steam, warm compresses  --Motrin as needed  --Follow-up with ENT for ongoing symptoms over the next week and/or recurrent episodes.  Go to ER if worse.     If tests have been performed at [facility], our office will contact you with results if changes need to be made to the care plan discussed with you at the visit.  [facility]    Chief Complaint     Chief Complaint   Patient presents with    Cold Like Symptoms     URI s/s with sinus pain and pressure x 1.5 weeks ago.          History of Present Illness       Here with complaints of suspected sinus infection.  Notes nasal/sinus congestion over the past 10 days with increased sinus pain and pressure over the past week.  Green rhinorrhea.  Some headaches.  No fever, sore throat.  Occasional cough.  No ear pain.  No GI complaints.  Taking Flonase and Sudafed without improvement.  Symptoms feel typical of prior sinus infections most recently 3 months ago.  Has seen ENT for this.  Told she needs surgery, but \"does not have time for surgery at present\".  Denies pregnancy.        Review of Systems   Review of Systems   Constitutional:  Negative for fever.   HENT:  Positive for rhinorrhea, sinus pressure and sinus pain. Negative for ear pain.    Respiratory:  Positive for cough.  " "  Gastrointestinal:  Negative for vomiting.   Neurological:  Positive for headaches.         Current Medications     Current Medications[1]    Current Allergies     Allergies as of 06/01/2025 - Reviewed 06/01/2025   Allergen Reaction Noted    Other Anaphylaxis 02/11/2016    Betadine [povidone iodine]  02/26/2020    Celebrex [celecoxib] Hives 10/16/2017    Nuts - food allergy Other (See Comments) 10/16/2017    Wheat bran - food allergy Other (See Comments) 09/26/2023            The following portions of the patient's history were reviewed and updated as appropriate: allergies, current medications, past family history, past medical history, past social history, past surgical history and problem list.     Past Medical History[2]    Past Surgical History[3]    Family History[4]      Medications have been verified.        Objective   BP 96/54   Pulse 57   Temp 98.1 °F (36.7 °C)   Resp 14   Ht 5' 7\" (1.702 m)   Wt 54.4 kg (120 lb)   SpO2 99%   BMI 18.79 kg/m²   No LMP recorded.       Physical Exam     Physical Exam  Constitutional:       General: She is not in acute distress.     Appearance: Normal appearance. She is well-developed. She is not ill-appearing, toxic-appearing or diaphoretic.   HENT:      Head: Normocephalic.      Right Ear: Tympanic membrane, ear canal and external ear normal.      Left Ear: Tympanic membrane, ear canal and external ear normal.      Nose: Congestion present. No rhinorrhea.      Comments: Bilateral maxillary sinus tenderness without overlying skin changes.     Mouth/Throat:      Pharynx: No oropharyngeal exudate or posterior oropharyngeal erythema.     Eyes:      General:         Right eye: No discharge.         Left eye: No discharge.       Cardiovascular:      Rate and Rhythm: Normal rate and regular rhythm.      Heart sounds: Normal heart sounds. No murmur heard.  Pulmonary:      Effort: Pulmonary effort is normal. No respiratory distress.      Breath sounds: Normal breath sounds. " No stridor. No wheezing, rhonchi or rales.   Chest:      Chest wall: No tenderness.     Musculoskeletal:      Cervical back: Neck supple.   Lymphadenopathy:      Cervical: No cervical adenopathy.     Skin:     General: Skin is warm and dry.     Neurological:      Mental Status: She is alert and oriented to person, place, and time.      Deep Tendon Reflexes: Reflexes are normal and symmetric.     Psychiatric:         Mood and Affect: Mood normal.                        [1]   Current Outpatient Medications:     amoxicillin-clavulanate (AUGMENTIN) 875-125 mg per tablet, Take 1 tablet by mouth every 12 (twelve) hours for 10 days, Disp: 20 tablet, Rfl: 0    albuterol (PROVENTIL HFA,VENTOLIN HFA) 90 mcg/act inhaler, Inhale 2 puffs every 6 (six) hours as needed for wheezing, Disp: , Rfl:     budesonide-formoterol (SYMBICORT) 160-4.5 mcg/act inhaler, Inhale 2 puffs 2 (two) times a day Rinse mouth after use. (Patient not taking: Reported on 3/30/2025), Disp: , Rfl:     EPINEPHrine (EPIPEN) 0.3 mg/0.3 mL SOAJ, Inject 0.3 mL (0.3 mg total) into a muscle daily as needed for anaphylaxis PRN, Disp: 2 each, Rfl: 1    EPINEPHrine 2 MG/0.1ML SOLN, 2 mg into each nostril once as needed (Anaphylaxis), Disp: 2 each, Rfl: 1  [2]   Past Medical History:  Diagnosis Date    ADHD     Asthma     Attention deficit hyperactivity disorder (ADHD), combined type 9/26/2024    Pernio     Raynaud disease    [3]   Past Surgical History:  Procedure Laterality Date    HAND SURGERY      ganglion cyst    KS ARTHROSCOPY KNEE DIAGNOSTIC W/WO SYNOVIAL BX SPX Right 05/21/2020    Procedure: ARTHROSCOPY KNEE;  Surgeon: Pawan Reaves MD;  Location: [facility];  Service: Orthopedics    KS LIGAMENTOUS RECONSTRUCTION KNEE EXTRA-ARTICULAR Right 05/21/2020    Procedure: KNEE DIAGNOSTIC ARTHROSCOPY WITH OPEN MEDIAL PATELLOFEMORAL LIGAMENT RECONSTRUCTION WITH HAMSTRING AUTOGRAFT (MPFL);  Surgeon: Pawan Reaves MD;  Location: [facility];  Service: Orthopedics     TENDON REPAIR  05/2020   [4]   Family History  Problem Relation Name Age of Onset    No Known Problems Mother      Coronary artery disease Father      Dilated cardiomyopathy Father      No Known Problems Sister      No Known Problems Brother      No Known Problems Maternal Grandmother      No Known Problems Maternal Grandfather      No Known Problems Paternal Grandmother      No Known Problems Paternal Grandfather      No Known Problems Maternal Aunt      No Known Problems Maternal Uncle      No Known Problems Paternal Aunt      No Known Problems Paternal Uncle

## 2025-06-11 NOTE — PROGRESS NOTES
Name: Tamera Sanders      : 2000      MRN: [id number]  Encounter Provider: Sherita Busch PA-C  Encounter Date: 2025   Encounter department: [facility]  :  Assessment & Plan  Encounter for annual routine gynecological examination     Pap every 3 years if normal.  STI testing collected  Exercise most days of week-minimum of 150-300 minutes per week.   Obtain appropriate diet and hydration.   Calcium 1000mg (in divided doses-max 600 mg at one time) + 600 vit D daily.  Condom use when sexually active for sexually transmitted infection prevention.   HPV 9 vaccine series completed per pt    monthly breast self exam recommended.   Kegels 20 times twice daily.   Call your insurance company to verify coverage prior to completing any ordered tests.   Return to office in one year or sooner, if needed.      Screening for cervical cancer    Orders:    Liquid-based pap, screening    Birth control counseling     Pt desires BC. I thoroughly r/w pt all available options for BC including OCPs, patch, ring, Depo Provera, IUDs, Nexplanon and condoms. Aware of need for condoms for continued STD protection regardless of type of BC she chooses. Aware of risks and benefits and usage of each form of BC. Aware of start up, back up, etc.   Patient desires the ParaGard IUD  Will try to insert at tail end of next menses.  No sex 2 weeks before insertion  UPT will be done on day of insertion.   Take ibuprofen 30 mins prior to procedure  Information pamphlet provided to patient    Screening for STD (sexually transmitted disease)    Orders:    Chlamydia/GC amplified DNA by PCR    HIV 1/2 AG/AB w Reflex [facility] for 2 yr old and above    Hepatitis B surface antigen; Future    Hepatitis C antibody; Future    RPR-Syphilis Screening (Total Syphilis IGG/IGM)    Low iron    Orders:    CBC and differential; Future    Ferritin; Future        History of Present Illness   HPI  Tamera Sanders is a 25 y.o.  female who presents to establish as new patient    LMP 5/26/25 - 3-4 days/regular 28-30   Sexually active Not currently    Birth control No  Desiring pregnancy in the next year No  History of abnormal pap: No  Last pap Not on file , Next pap: today   Self breast exam No  HPV vaccine series completed: yes per pt    Patient is concerned that she is iron deficient, she tried donating blood recently but was told she was unable to because her iron levels were too low. Patient requesting to have her levels checked    Hereditary Cancer Screening  FH Breast/Ovarian cancer: denies  FH Uterine cancer: denies  FH Colon cancer: Father/PGM/PGF  Cancer-related family history is not on file.  Patient has had negative genetic testing      Review of Systems   Constitutional:  Negative for chills, fatigue and fever.   Gastrointestinal:  Negative for abdominal pain, anal bleeding, blood in stool, constipation, diarrhea, nausea and vomiting.   Genitourinary:  Negative for difficulty urinating, dyspareunia, dysuria, frequency, hematuria, menstrual problem, pelvic pain, urgency, vaginal bleeding, vaginal discharge and vaginal pain.     Medications Ordered Prior to Encounter[1]   Social History[2]     Objective   /72 (BP Location: Left arm, Patient Position: Sitting, Cuff Size: Standard)   Wt 55.5 kg (122 lb 6.4 oz)   BMI 19.17 kg/m²      Physical Exam  Vitals and nursing note reviewed.   Constitutional:       General: She is not in acute distress.     Appearance: She is well-developed.   HENT:      Head: Normocephalic and atraumatic.     Eyes:      Extraocular Movements: Extraocular movements intact.     Pulmonary:      Effort: Pulmonary effort is normal.   Chest:   Breasts:     Right: No swelling, bleeding, inverted nipple, mass, nipple discharge, skin change or tenderness.      Left: No swelling, bleeding, inverted nipple, mass, nipple discharge, skin change or tenderness.   Abdominal:      Palpations: Abdomen is soft.       Tenderness: There is no abdominal tenderness.      Hernia: There is no hernia in the left inguinal area or right inguinal area.   Genitourinary:     General: Normal vulva.      Exam position: Lithotomy position.      Pubic Area: No rash.       Labia:         Right: No rash, tenderness or lesion.         Left: No rash, tenderness or lesion.       Urethra: No urethral pain or urethral swelling.      Vagina: No vaginal discharge, erythema, tenderness, bleeding or lesions.      Cervix: Eversion present. No cervical motion tenderness, discharge, friability, lesion, erythema or cervical bleeding.      Uterus: Not enlarged and not tender.       Adnexa:         Right: No mass, tenderness or fullness.          Left: No mass, tenderness or fullness.     Lymphadenopathy:      Upper Body:      Right upper body: No supraclavicular, axillary or pectoral adenopathy.      Left upper body: No supraclavicular, axillary or pectoral adenopathy.      Lower Body: No right inguinal adenopathy. No left inguinal adenopathy.     Skin:     General: Skin is warm and dry.     Neurological:      Mental Status: She is alert.     Psychiatric:         Mood and Affect: Mood normal.         Behavior: Behavior normal.       Sherita Busch PA-C         [1]   Current Outpatient Medications on File Prior to Visit   Medication Sig Dispense Refill    albuterol (PROVENTIL HFA,VENTOLIN HFA) 90 mcg/act inhaler Inhale 2 puffs every 6 (six) hours as needed for wheezing      EPINEPHrine (EPIPEN) 0.3 mg/0.3 mL SOAJ Inject 0.3 mL (0.3 mg total) into a muscle daily as needed for anaphylaxis PRN 2 each 1    EPINEPHrine 2 MG/0.1ML SOLN 2 mg into each nostril once as needed (Anaphylaxis) 2 each 1    [] amoxicillin-clavulanate (AUGMENTIN) 875-125 mg per tablet Take 1 tablet by mouth every 12 (twelve) hours for 10 days 20 tablet 0    [DISCONTINUED] budesonide-formoterol (SYMBICORT) 160-4.5 mcg/act inhaler Inhale 2 puffs 2 (two) times a day Rinse mouth after use.  (Patient not taking: Reported on 6/12/2025)       No current facility-administered medications on file prior to visit.   [2]   Social History  Tobacco Use    Smoking status: Never     Passive exposure: Never    Smokeless tobacco: Never   Vaping Use    Vaping status: Never Used   Substance and Sexual Activity    Alcohol use: No    Drug use: No    Sexual activity: Not Currently     Partners: Male     Birth control/protection: Condom Male

## 2025-06-12 ENCOUNTER — OFFICE VISIT (OUTPATIENT)
Dept: OBGYN CLINIC | Facility: CLINIC | Age: 25
End: 2025-06-12
Payer: COMMERCIAL

## 2025-06-12 VITALS — DIASTOLIC BLOOD PRESSURE: 72 MMHG | SYSTOLIC BLOOD PRESSURE: 104 MMHG | WEIGHT: 122.4 LBS | BODY MASS INDEX: 19.17 KG/M2

## 2025-06-12 DIAGNOSIS — Z11.3 SCREENING FOR STD (SEXUALLY TRANSMITTED DISEASE): ICD-10-CM

## 2025-06-12 DIAGNOSIS — Z12.4 SCREENING FOR CERVICAL CANCER: ICD-10-CM

## 2025-06-12 DIAGNOSIS — E61.1 LOW IRON: ICD-10-CM

## 2025-06-12 DIAGNOSIS — Z30.09 BIRTH CONTROL COUNSELING: ICD-10-CM

## 2025-06-12 DIAGNOSIS — Z01.419 ENCOUNTER FOR ANNUAL ROUTINE GYNECOLOGICAL EXAMINATION: Primary | ICD-10-CM

## 2025-06-12 PROCEDURE — 87491 CHLMYD TRACH DNA AMP PROBE: CPT

## 2025-06-12 PROCEDURE — 99385 PREV VISIT NEW AGE 18-39: CPT

## 2025-06-12 PROCEDURE — 87591 N.GONORRHOEAE DNA AMP PROB: CPT

## 2025-06-12 PROCEDURE — G0145 SCR C/V CYTO,THINLAYER,RESCR: HCPCS

## 2025-06-16 ENCOUNTER — APPOINTMENT (OUTPATIENT)
Dept: LAB | Facility: HOSPITAL | Age: 25
End: 2025-06-16
Payer: COMMERCIAL

## 2025-06-16 DIAGNOSIS — E61.1 LOW IRON: ICD-10-CM

## 2025-06-16 DIAGNOSIS — Z11.3 SCREENING FOR STD (SEXUALLY TRANSMITTED DISEASE): ICD-10-CM

## 2025-06-16 LAB
BASOPHILS # BLD AUTO: 0.02 THOUSANDS/ÂΜL (ref 0–0.1)
BASOPHILS NFR BLD AUTO: 0 % (ref 0–1)
C TRACH DNA SPEC QL NAA+PROBE: NEGATIVE
EOSINOPHIL # BLD AUTO: 0.29 THOUSAND/ÂΜL (ref 0–0.61)
EOSINOPHIL NFR BLD AUTO: 4 % (ref 0–6)
ERYTHROCYTE [DISTWIDTH] IN BLOOD BY AUTOMATED COUNT: 12.1 % (ref 11.6–15.1)
FERRITIN SERPL-MCNC: 36 NG/ML (ref 30–307)
HBV SURFACE AG SER QL: NORMAL
HCT VFR BLD AUTO: 38.8 % (ref 34.8–46.1)
HCV AB SER QL: NORMAL
HGB BLD-MCNC: 13 G/DL (ref 11.5–15.4)
HIV 1+2 AB+HIV1 P24 AG SERPL QL IA: NORMAL
IMM GRANULOCYTES # BLD AUTO: 0.02 THOUSAND/UL (ref 0–0.2)
IMM GRANULOCYTES NFR BLD AUTO: 0 % (ref 0–2)
LYMPHOCYTES # BLD AUTO: 1.58 THOUSANDS/ÂΜL (ref 0.6–4.47)
LYMPHOCYTES NFR BLD AUTO: 23 % (ref 14–44)
MCH RBC QN AUTO: 30.2 PG (ref 26.8–34.3)
MCHC RBC AUTO-ENTMCNC: 33.5 G/DL (ref 31.4–37.4)
MCV RBC AUTO: 90 FL (ref 82–98)
MONOCYTES # BLD AUTO: 0.63 THOUSAND/ÂΜL (ref 0.17–1.22)
MONOCYTES NFR BLD AUTO: 9 % (ref 4–12)
N GONORRHOEA DNA SPEC QL NAA+PROBE: NEGATIVE
NEUTROPHILS # BLD AUTO: 4.27 THOUSANDS/ÂΜL (ref 1.85–7.62)
NEUTS SEG NFR BLD AUTO: 64 % (ref 43–75)
NRBC BLD AUTO-RTO: 0 /100 WBCS
PLATELET # BLD AUTO: 224 THOUSANDS/UL (ref 149–390)
PMV BLD AUTO: 11.6 FL (ref 8.9–12.7)
RBC # BLD AUTO: 4.3 MILLION/UL (ref 3.81–5.12)
TREPONEMA PALLIDUM IGG+IGM AB [PRESENCE] IN SERUM OR PLASMA BY IMMUNOASSAY: NORMAL
WBC # BLD AUTO: 6.81 THOUSAND/UL (ref 4.31–10.16)

## 2025-06-16 PROCEDURE — 86803 HEPATITIS C AB TEST: CPT

## 2025-06-16 PROCEDURE — 86780 TREPONEMA PALLIDUM: CPT

## 2025-06-16 PROCEDURE — 87389 HIV-1 AG W/HIV-1&-2 AB AG IA: CPT

## 2025-06-16 PROCEDURE — 82728 ASSAY OF FERRITIN: CPT

## 2025-06-16 PROCEDURE — 36415 COLL VENOUS BLD VENIPUNCTURE: CPT

## 2025-06-16 PROCEDURE — 87340 HEPATITIS B SURFACE AG IA: CPT

## 2025-06-16 PROCEDURE — 85025 COMPLETE CBC W/AUTO DIFF WBC: CPT

## 2025-06-18 ENCOUNTER — RESULTS FOLLOW-UP (OUTPATIENT)
Age: 25
End: 2025-06-18

## 2025-06-19 LAB
LAB AP GYN PRIMARY INTERPRETATION: NORMAL
Lab: NORMAL

## 2025-07-28 ENCOUNTER — TELEPHONE (OUTPATIENT)
Age: 25
End: 2025-07-28

## 2025-08-05 ENCOUNTER — TELEPHONE (OUTPATIENT)
Age: 25
End: 2025-08-05

## (undated) DEVICE — ABDOMINAL PAD: Brand: DERMACEA

## (undated) DEVICE — PACK GENERAL LF

## (undated) DEVICE — INTENDED FOR TISSUE SEPARATION, AND OTHER PROCEDURES THAT REQUIRE A SHARP SURGICAL BLADE TO PUNCTURE OR CUT.: Brand: BARD-PARKER SAFETY BLADES SIZE 11, STERILE

## (undated) DEVICE — INTENDED FOR TISSUE SEPARATION, AND OTHER PROCEDURES THAT REQUIRE A SHARP SURGICAL BLADE TO PUNCTURE OR CUT.: Brand: BARD-PARKER SAFETY BLADES SIZE 15, STERILE

## (undated) DEVICE — PAD CAST 4 IN COTTON NON STERILE

## (undated) DEVICE — HALF SHEET: Brand: CONVERTORS

## (undated) DEVICE — SUT FIBERLOOP 2-0 STRT DIAMOND PT 13IN AR-7232-03

## (undated) DEVICE — TIBURON EXTREMITY SHEET: Brand: CONVERTORS

## (undated) DEVICE — CARDINAL HEALTH LAP SPONGE  8 X 36 IN. PREWASHED X-RAY DETECTABLE SOFTPACK: Brand: CARDINAL HEALTH

## (undated) DEVICE — OCCLUSIVE GAUZE STRIP,3% BISMUTH TRIBROMOPHENATE IN PETROLATUM BLEND: Brand: XEROFORM

## (undated) DEVICE — CUFF TOURNIQUET 30 X 4 IN QUICK CONNECT DISP 1BLA

## (undated) DEVICE — CHLORAPREP HI-LITE 26ML ORANGE

## (undated) DEVICE — SUT FIBERWIRE #2 1/2 CIRCLE T-5 38IN AR-7200

## (undated) DEVICE — ANTIBACTERIAL UNDYED BRAIDED (POLYGLACTIN 910), SYNTHETIC ABSORBABLE SUTURE: Brand: COATED VICRYL

## (undated) DEVICE — UNDYED BRAIDED (POLYGLACTIN 910), SYNTHETIC ABSORBABLE SUTURE: Brand: COATED VICRYL

## (undated) DEVICE — PACK ARTHROSCOPY

## (undated) DEVICE — TIBURON SPLIT SHEET: Brand: CONVERTORS

## (undated) DEVICE — ADHESIVE SKIN HIGH VISCOSITY EXOFIN 1ML